# Patient Record
Sex: FEMALE | Race: WHITE | NOT HISPANIC OR LATINO | Employment: OTHER | URBAN - METROPOLITAN AREA
[De-identification: names, ages, dates, MRNs, and addresses within clinical notes are randomized per-mention and may not be internally consistent; named-entity substitution may affect disease eponyms.]

---

## 2020-03-06 ENCOUNTER — HOSPITAL ENCOUNTER (EMERGENCY)
Facility: HOSPITAL | Age: 55
Discharge: HOME | End: 2020-03-06
Attending: EMERGENCY MEDICINE | Admitting: EMERGENCY MEDICINE
Payer: COMMERCIAL

## 2020-03-06 VITALS
BODY MASS INDEX: 19.47 KG/M2 | TEMPERATURE: 98.8 F | SYSTOLIC BLOOD PRESSURE: 91 MMHG | OXYGEN SATURATION: 100 % | WEIGHT: 131.84 LBS | DIASTOLIC BLOOD PRESSURE: 52 MMHG | RESPIRATION RATE: 16 BRPM | HEART RATE: 78 BPM

## 2020-03-06 DIAGNOSIS — B34.9 VIRAL SYNDROME: Primary | ICD-10-CM

## 2020-03-06 DIAGNOSIS — R51.9 HEADACHE: ICD-10-CM

## 2020-03-06 LAB
ALBUMIN SERPL BCP-MCNC: 3.7 G/DL (ref 3.5–5)
ALP SERPL-CCNC: 38 U/L (ref 46–116)
ALT SERPL W P-5'-P-CCNC: 25 U/L (ref 12–78)
ANION GAP SERPL CALCULATED.3IONS-SCNC: 6 MMOL/L (ref 4–13)
AST SERPL W P-5'-P-CCNC: 19 U/L (ref 5–45)
BASOPHILS # BLD AUTO: 0 THOUSANDS/ΜL (ref 0–0.1)
BASOPHILS NFR BLD AUTO: 0 % (ref 0–1)
BILIRUB SERPL-MCNC: 1.59 MG/DL (ref 0.2–1)
BUN SERPL-MCNC: 19 MG/DL (ref 5–25)
CALCIUM SERPL-MCNC: 8.4 MG/DL (ref 8.3–10.1)
CHLORIDE SERPL-SCNC: 106 MMOL/L (ref 100–108)
CK SERPL-CCNC: 86 U/L (ref 26–192)
CO2 SERPL-SCNC: 30 MMOL/L (ref 21–32)
CREAT SERPL-MCNC: 0.69 MG/DL (ref 0.6–1.3)
CRP SERPL QL: 5.8 MG/L
EOSINOPHIL # BLD AUTO: 0.14 THOUSAND/ΜL (ref 0–0.61)
EOSINOPHIL NFR BLD AUTO: 2 % (ref 0–6)
ERYTHROCYTE [DISTWIDTH] IN BLOOD BY AUTOMATED COUNT: 12.3 % (ref 11.6–15.1)
FLUAV RNA NPH QL NAA+PROBE: NORMAL
FLUBV RNA NPH QL NAA+PROBE: NORMAL
GFR SERPL CREATININE-BSD FRML MDRD: 99 ML/MIN/1.73SQ M
GLUCOSE SERPL-MCNC: 95 MG/DL (ref 65–140)
HCT VFR BLD AUTO: 37 % (ref 34.8–46.1)
HGB BLD-MCNC: 12.4 G/DL (ref 11.5–15.4)
IMM GRANULOCYTES # BLD AUTO: 0.01 THOUSAND/UL (ref 0–0.2)
IMM GRANULOCYTES NFR BLD AUTO: 0 % (ref 0–2)
LYMPHOCYTES # BLD AUTO: 0.21 THOUSANDS/ΜL (ref 0.6–4.47)
LYMPHOCYTES NFR BLD AUTO: 4 % (ref 14–44)
MCH RBC QN AUTO: 31.2 PG (ref 26.8–34.3)
MCHC RBC AUTO-ENTMCNC: 33.5 G/DL (ref 31.4–37.4)
MCV RBC AUTO: 93 FL (ref 82–98)
MONOCYTES # BLD AUTO: 0.31 THOUSAND/ΜL (ref 0.17–1.22)
MONOCYTES NFR BLD AUTO: 5 % (ref 4–12)
NEUTROPHILS # BLD AUTO: 5.16 THOUSANDS/ΜL (ref 1.85–7.62)
NEUTS SEG NFR BLD AUTO: 89 % (ref 43–75)
NRBC BLD AUTO-RTO: 0 /100 WBCS
PLATELET # BLD AUTO: 158 THOUSANDS/UL (ref 149–390)
PMV BLD AUTO: 9.2 FL (ref 8.9–12.7)
POTASSIUM SERPL-SCNC: 4.3 MMOL/L (ref 3.5–5.3)
PROT SERPL-MCNC: 6.8 G/DL (ref 6.4–8.2)
RBC # BLD AUTO: 3.98 MILLION/UL (ref 3.81–5.12)
RSV RNA NPH QL NAA+PROBE: NORMAL
SODIUM SERPL-SCNC: 142 MMOL/L (ref 136–145)
WBC # BLD AUTO: 5.83 THOUSAND/UL (ref 4.31–10.16)

## 2020-03-06 PROCEDURE — 96375 TX/PRO/DX INJ NEW DRUG ADDON: CPT

## 2020-03-06 PROCEDURE — 96361 HYDRATE IV INFUSION ADD-ON: CPT

## 2020-03-06 PROCEDURE — 85025 COMPLETE CBC W/AUTO DIFF WBC: CPT | Performed by: PHYSICIAN ASSISTANT

## 2020-03-06 PROCEDURE — 80053 COMPREHEN METABOLIC PANEL: CPT | Performed by: PHYSICIAN ASSISTANT

## 2020-03-06 PROCEDURE — 86140 C-REACTIVE PROTEIN: CPT | Performed by: PHYSICIAN ASSISTANT

## 2020-03-06 PROCEDURE — 82550 ASSAY OF CK (CPK): CPT | Performed by: PHYSICIAN ASSISTANT

## 2020-03-06 PROCEDURE — 99283 EMERGENCY DEPT VISIT LOW MDM: CPT

## 2020-03-06 PROCEDURE — 36415 COLL VENOUS BLD VENIPUNCTURE: CPT | Performed by: PHYSICIAN ASSISTANT

## 2020-03-06 PROCEDURE — 96374 THER/PROPH/DIAG INJ IV PUSH: CPT

## 2020-03-06 PROCEDURE — 99283 EMERGENCY DEPT VISIT LOW MDM: CPT | Performed by: PHYSICIAN ASSISTANT

## 2020-03-06 PROCEDURE — 87631 RESP VIRUS 3-5 TARGETS: CPT | Performed by: EMERGENCY MEDICINE

## 2020-03-06 RX ORDER — MAGNESIUM HYDROXIDE/ALUMINUM HYDROXICE/SIMETHICONE 120; 1200; 1200 MG/30ML; MG/30ML; MG/30ML
30 SUSPENSION ORAL ONCE
Status: COMPLETED | OUTPATIENT
Start: 2020-03-06 | End: 2020-03-06

## 2020-03-06 RX ORDER — PAROXETINE 30 MG/1
30 TABLET, FILM COATED ORAL EVERY MORNING
COMMUNITY

## 2020-03-06 RX ORDER — ACETAMINOPHEN 325 MG/1
975 TABLET ORAL ONCE
Status: COMPLETED | OUTPATIENT
Start: 2020-03-06 | End: 2020-03-06

## 2020-03-06 RX ORDER — DIPHENHYDRAMINE HYDROCHLORIDE 50 MG/ML
25 INJECTION INTRAMUSCULAR; INTRAVENOUS ONCE
Status: COMPLETED | OUTPATIENT
Start: 2020-03-06 | End: 2020-03-06

## 2020-03-06 RX ORDER — METOCLOPRAMIDE HYDROCHLORIDE 5 MG/ML
10 INJECTION INTRAMUSCULAR; INTRAVENOUS ONCE
Status: COMPLETED | OUTPATIENT
Start: 2020-03-06 | End: 2020-03-06

## 2020-03-06 RX ADMIN — DEXAMETHASONE SODIUM PHOSPHATE 10 MG: 10 INJECTION, SOLUTION INTRAMUSCULAR; INTRAVENOUS at 13:34

## 2020-03-06 RX ADMIN — SODIUM CHLORIDE 1000 ML: 0.9 INJECTION, SOLUTION INTRAVENOUS at 13:37

## 2020-03-06 RX ADMIN — DIPHENHYDRAMINE HYDROCHLORIDE 25 MG: 50 INJECTION, SOLUTION INTRAMUSCULAR; INTRAVENOUS at 13:38

## 2020-03-06 RX ADMIN — ACETAMINOPHEN 975 MG: 325 TABLET, FILM COATED ORAL at 13:34

## 2020-03-06 RX ADMIN — ALUMINUM HYDROXIDE, MAGNESIUM HYDROXIDE, AND SIMETHICONE 30 ML: 200; 200; 20 SUSPENSION ORAL at 13:34

## 2020-03-06 RX ADMIN — METOCLOPRAMIDE HYDROCHLORIDE 10 MG: 5 INJECTION INTRAMUSCULAR; INTRAVENOUS at 13:41

## 2020-03-06 NOTE — ED PROVIDER NOTES
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 " Denies fevers  32-year-old past medical history depression, kidney stones presents to the ER today for generalized body aches and headache x1 day  Reports joint stiffness, achiness, and pain in all joints as well as a frontal headache that began yesterday  Reports nausea and 1 episode of diarrhea this morning  Denies fever, upper respiratory congestion or symptoms, vomiting, abdominal pain  Took 2 Aleve this morning without relief  She is most concerned about the severity of her headache and that she may have the flu 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 Taking? PARoxetine (PAXIL) 30 mg tablet   Yes Yes   Sig: Take 30 mg by mouth every morning      Facility-Administered Medications: None       History reviewed  No pertinent past medical history  Past Surgical History:   Procedure Laterality Date    AUGMENTATION BREAST      LAPAROTOMY         History reviewed  No pertinent family history  I have reviewed and agree with the history as documented  E-Cigarette/Vaping     E-Cigarette/Vaping Substances     Social History     Tobacco Use    Smoking status: Not on file   Substance Use Topics    Alcohol use: Not on file    Drug use: Not on file       Review of Systems   Constitutional: Positive for fatigue  Negative for chills, diaphoresis and fever  HENT: Negative for congestion, sneezing and sore throat  Eyes: Negative  Respiratory: Negative  Negative for cough, chest tightness, shortness of breath and wheezing  Cardiovascular: Negative  Negative for chest pain, palpitations and leg swelling  Gastrointestinal: Positive for diarrhea and nausea   Negative for abdominal pain, blood in stool, constipation and vomiting  Endocrine: Negative  Genitourinary: Negative  Negative for difficulty urinating, dysuria, frequency and menstrual problem  Musculoskeletal: Positive for arthralgias and myalgias  Negative for gait problem and joint swelling  Neurological: Negative for dizziness, tremors, seizures, syncope, weakness, light-headedness, numbness and headaches  Psychiatric/Behavioral: Negative  Physical Exam  Physical Exam   Constitutional: She is oriented to person, place, and time  She appears well-developed and well-nourished  She does not have a sickly appearance  She does not appear ill  No distress  HENT:   Head: Normocephalic and atraumatic  Right Ear: No tenderness  Tympanic membrane is not erythematous, not retracted and not bulging  Left Ear: No tenderness  Tympanic membrane is not erythematous, not retracted and not bulging  Nose: No rhinorrhea  Right sinus exhibits maxillary sinus tenderness  Right sinus exhibits no frontal sinus tenderness  Left sinus exhibits maxillary sinus tenderness  Left sinus exhibits no frontal sinus tenderness  Mouth/Throat: Oropharynx is clear and moist and mucous membranes are normal    Scalp non tender to palpation   Eyes: Pupils are equal, round, and reactive to light  Conjunctivae and EOM are normal    Neck: Normal range of motion  Neck supple  Paraspinal muscular tenderness to palpation   Cardiovascular: Normal rate, regular rhythm, S1 normal, S2 normal and intact distal pulses  Exam reveals no gallop and no friction rub  No murmur heard  Pulmonary/Chest: Effort normal and breath sounds normal  No respiratory distress  She has no decreased breath sounds  She has no wheezes  She has no rhonchi  She has no rales  She exhibits no tenderness  Abdominal: Soft  Bowel sounds are normal  She exhibits no distension  There is no tenderness  There is no rigidity and no guarding     Musculoskeletal:   All joints tender to palpation, full passive and active ROM in all joints without increasing pain  Neurological: She is alert and oriented to person, place, and time  No cranial nerve deficit or sensory deficit  GCS eye subscore is 4  GCS verbal subscore is 5  GCS motor subscore is 6  Strength 4/5 in upper and lower extremities  Tone normal   Skin: Skin is warm and dry  Capillary refill takes less than 2 seconds  No rash noted  She is not diaphoretic  No erythema  Vitals reviewed        Vital Signs  ED Triage Vitals   Temperature Pulse Respirations Blood Pressure SpO2   03/06/20 1158 03/06/20 1158 03/06/20 1158 03/06/20 1158 03/06/20 1158   98 8 °F (37 1 °C) 86 18 99/54 97 %      Temp Source Heart Rate Source Patient Position - Orthostatic VS BP Location FiO2 (%)   03/06/20 1158 03/06/20 1158 03/06/20 1158 03/06/20 1158 --   Oral Monitor Sitting Right arm       Pain Score       03/06/20 1400       No Pain           Vitals:    03/06/20 1158 03/06/20 1425   BP: 99/54 91/52   Pulse: 86 78   Patient Position - Orthostatic VS: Sitting Lying         Visual Acuity      ED Medications  Medications   acetaminophen (TYLENOL) tablet 975 mg (975 mg Oral Given 3/6/20 1334)   metoclopramide (REGLAN) injection 10 mg (10 mg Intravenous Given 3/6/20 1341)   diphenhydrAMINE (BENADRYL) injection 25 mg (25 mg Intravenous Given 3/6/20 1338)   sodium chloride 0 9 % bolus 1,000 mL (0 mL Intravenous Stopped 3/6/20 1513)   aluminum-magnesium hydroxide-simethicone (MYLANTA) 200-200-20 mg/5 mL oral suspension 30 mL (30 mL Oral Given 3/6/20 1334)   dexamethasone 10 mg/mL oral liquid 10 mg 1 mL (10 mg Oral Given 3/6/20 1334)       Diagnostic Studies  Results Reviewed     Procedure Component Value Units Date/Time    Influenza A/B and RSV PCR [[id number]]  (Normal) Collected:  03/06/20 1344    Lab Status:  Final result Specimen:  Nasopharyngeal Swab Updated:  03/06/20 1425     INFLUENZA A PCR None Detected     INFLUENZA B PCR None Detected     RSV PCR None Detected    CK (with reflex to MB) [[id number]]  (Normal) Collected:  03/06/20 1313    Lab Status:  Final result Specimen:  Blood from Arm, Right Updated:  03/06/20 1342     Total CK 86 U/L     C-reactive protein [[id number]]  (Abnormal) Collected:  03/06/20 1313    Lab Status:  Final result Specimen:  Blood from Arm, Right Updated:  03/06/20 1342     CRP 5 8 mg/L     Comprehensive metabolic panel [[id number]]  (Abnormal) Collected:  03/06/20 1313    Lab Status:  Final result Specimen:  Blood from Arm, Right Updated:  03/06/20 1332     Sodium 142 mmol/L      Potassium 4 3 mmol/L      Chloride 106 mmol/L      CO2 30 mmol/L      ANION GAP 6 mmol/L      BUN 19 mg/dL      Creatinine 0 69 mg/dL      Glucose 95 mg/dL      Calcium 8 4 mg/dL      AST 19 U/L      ALT 25 U/L      Alkaline Phosphatase 38 U/L      Total Protein 6 8 g/dL      Albumin 3 7 g/dL      Total Bilirubin 1 59 mg/dL      eGFR 99 ml/min/1 73sq m     Narrative:       [place] guidelines for Chronic Kidney Disease (CKD):     Stage 1 with normal or high GFR (GFR > 90 mL/min/1 73 square meters)    Stage 2 Mild CKD (GFR = 60-89 mL/min/1 73 square meters)    Stage 3A Moderate CKD (GFR = 45-59 mL/min/1 73 square meters)    Stage 3B Moderate CKD (GFR = 30-44 mL/min/1 73 square meters)    Stage 4 Severe CKD (GFR = 15-29 mL/min/1 73 square meters)    Stage 5 End Stage CKD (GFR <15 mL/min/1 73 square meters)  Note: GFR calculation is accurate only with a steady state creatinine    CBC and differential [[id number]]  (Abnormal) Collected:  03/06/20 1313    Lab Status:  Final result Specimen:  Blood from Arm, Right Updated:  03/06/20 1318     WBC 5 83 Thousand/uL      RBC 3 98 Million/uL      Hemoglobin 12 4 g/dL      Hematocrit 37 0 %      MCV 93 fL      MCH 31 2 pg      MCHC 33 5 g/dL      RDW 12 3 %      MPV 9 2 fL      Platelets 487 Thousands/uL      nRBC 0 /100 WBCs      Neutrophils Relative 89 %      Immat GRANS % 0 %      Lymphocytes Relative 4 % Monocytes Relative 5 %      Eosinophils Relative 2 %      Basophils Relative 0 %      Neutrophils Absolute 5 16 Thousands/µL      Immature Grans Absolute 0 01 Thousand/uL      Lymphocytes Absolute 0 21 Thousands/µL      Monocytes Absolute 0 31 Thousand/µL      Eosinophils Absolute 0 14 Thousand/µL      Basophils Absolute 0 00 Thousands/µL                No orders to display              Procedures  Procedures         ED Course           MDM  Number of Diagnoses or Management Options  Headache:   Viral syndrome:   Diagnosis management comments: History and symptoms appear to be viral in nature, flu/RSV swab, CBC, CMP, CK, CRP ordered  Lab results within normal limits - WBC 5 3, CK 86, CRP 5 8,  flu/RSV swab negative  Given migraine cocktail for HA, which improved symptoms significantly  Patient's symptoms likely viral in nature  She was stable for discharge to home  She was advised to take Motrin, Tylenol, or over-the-counter cold medications for symptoms and follow-up with her PCP in 1 week, or sooner if symptoms worsen  She was given strict ER return precautions for worsening or persistent headache, worsening weakness, vision changes, dizziness, numbness or weakness in extremities, gait abnormalities  Disposition  Final diagnoses:   Viral syndrome   Headache     Time reflects when diagnosis was documented in both MDM as applicable and the Disposition within this note     Time User Action Codes Description Comment    3/6/2020  3:13 PM Vidal Lew Add [B34 9] Viral syndrome     3/6/2020  3:15 PM Daniele Harrison [facility] Headache       ED Disposition     ED Disposition Condition Date/Time Comment    Discharge Stable Fri Mar 6, 2020  3:13 PM Doc Venegas discharge to home/self care              Follow-up Information     Follow up With Specialties Details Why Contact Dwight Briscoe, DO Family Medicine In 1 week Schedule an ER follow up appointment with your PCP within one week or sooner as needed or if symptoms worsen [address]  [phone]            Discharge Medication List as of 3/6/2020  3:24 PM      CONTINUE these medications which have NOT CHANGED    Details   PARoxetine (PAXIL) 30 mg tablet Take 30 mg by mouth every morning, Historical Med           No discharge procedures on file      PDMP Review     None          ED Provider  Electronically Signed by           Kirill Dozier PA-C  03/06/20 0208

## 2020-05-14 NOTE — ED ATTENDING ATTESTATION
3/6/2020  IAwais MD, saw and evaluated the patient  I have discussed the patient with the resident/non-physician practitioner and agree with the resident's/non-physician practitioner's findings, Plan of Care, and MDM as documented in the resident's/non-physician practitioner's note, except where noted  All available labs and Radiology studies were reviewed  I was present for key portions of any procedure(s) performed by the resident/non-physician practitioner and I was immediately available to provide assistance  At this point I agree with the current assessment done in the Emergency Department  I have conducted an independent evaluation of this patient a history and physical is as follows: This is a 48 y o  old female who presents to the ED for evaluation of diffuse arthralgias, ha  Since yesterday, 2 aleve, no relief, some nausea and diarrhea  No fevers, but chills  No cough or congestion, sore throat  VS and nursing notes reviewed  General: Appears in NAD, awake, alert, speaking normally in full sentences  Well-nourished, well-developed  Appears stated age  Head: Normocephalic, atraumatic  Eyes: EOMI  Vision grossly normal  No subconjunctival hemorrhages or occular discharge noted  Symmetrical lids  ENT: Atraumatic external nose and ears  No stridor  Normal phonation  No drooling  Normal swallowing  Neck: No JVD  FROM  No goiter  CV: No pallor  Normal rate  Lungs: No tachypnea  No respiratory distress  MSK: Moving all extremities equally, no peripheral edema  Skin: Dry, intact  No cyanosis  Neuro: Awake, alert, GCS15  CN II-XII grossly intact  Grossly normal gait  Psychiatric/Behavioral: Appropriate mood and affect  A/P: This is a 48 y o  female who presents to the ED for evaluation of malaise  Suspect viral syndrome  Labs, IVF, Migraine cocktail, flu swab at patient request  Reevaluate and dispo accordingly      ED Course       Critical Care Time  Procedures
PAST MEDICAL HISTORY:  Anxiety

## 2021-12-24 ENCOUNTER — APPOINTMENT (EMERGENCY)
Dept: CT IMAGING | Facility: HOSPITAL | Age: 56
End: 2021-12-24
Payer: COMMERCIAL

## 2021-12-24 ENCOUNTER — HOSPITAL ENCOUNTER (OUTPATIENT)
Facility: HOSPITAL | Age: 56
Setting detail: OBSERVATION
Discharge: HOME | End: 2021-12-25
Attending: EMERGENCY MEDICINE | Admitting: INTERNAL MEDICINE
Payer: COMMERCIAL

## 2021-12-24 ENCOUNTER — APPOINTMENT (EMERGENCY)
Dept: RADIOLOGY | Facility: HOSPITAL | Age: 56
End: 2021-12-24
Payer: COMMERCIAL

## 2021-12-24 DIAGNOSIS — S09.90XA CLOSED HEAD INJURY, INITIAL ENCOUNTER: ICD-10-CM

## 2021-12-24 DIAGNOSIS — S01.81XA FACIAL LACERATION, INITIAL ENCOUNTER: ICD-10-CM

## 2021-12-24 DIAGNOSIS — R55 SYNCOPE: Primary | ICD-10-CM

## 2021-12-24 LAB
2HR DELTA HS TROPONIN: 7 NG/L
4HR DELTA HS TROPONIN: 13 NG/L
ALBUMIN SERPL BCP-MCNC: 3.9 G/DL (ref 3.5–5)
ALP SERPL-CCNC: 50 U/L (ref 46–116)
ALT SERPL W P-5'-P-CCNC: 25 U/L (ref 12–78)
ANION GAP SERPL CALCULATED.3IONS-SCNC: 7 MMOL/L (ref 4–13)
AST SERPL W P-5'-P-CCNC: 20 U/L (ref 5–45)
BASOPHILS # BLD AUTO: 0.04 THOUSANDS/ΜL (ref 0–0.1)
BASOPHILS NFR BLD AUTO: 1 % (ref 0–1)
BILIRUB SERPL-MCNC: 1.4 MG/DL (ref 0.2–1)
BUN SERPL-MCNC: 20 MG/DL (ref 5–25)
CALCIUM SERPL-MCNC: 9.3 MG/DL (ref 8.3–10.1)
CARDIAC TROPONIN I PNL SERPL HS: 15 NG/L
CARDIAC TROPONIN I PNL SERPL HS: 19 NG/L
CARDIAC TROPONIN I PNL SERPL HS: 21 NG/L
CARDIAC TROPONIN I PNL SERPL HS: 8 NG/L
CHLORIDE SERPL-SCNC: 104 MMOL/L (ref 100–108)
CO2 SERPL-SCNC: 28 MMOL/L (ref 21–32)
CREAT SERPL-MCNC: 0.94 MG/DL (ref 0.6–1.3)
D DIMER PPP FEU-MCNC: 0.73 UG/ML FEU
EOSINOPHIL # BLD AUTO: 0.18 THOUSAND/ΜL (ref 0–0.61)
EOSINOPHIL NFR BLD AUTO: 4 % (ref 0–6)
ERYTHROCYTE [DISTWIDTH] IN BLOOD BY AUTOMATED COUNT: 12.3 % (ref 11.6–15.1)
FLUAV RNA RESP QL NAA+PROBE: NEGATIVE
FLUBV RNA RESP QL NAA+PROBE: NEGATIVE
GFR SERPL CREATININE-BSD FRML MDRD: 68 ML/MIN/1.73SQ M
GLUCOSE SERPL-MCNC: 108 MG/DL (ref 65–140)
HCT VFR BLD AUTO: 38.1 % (ref 34.8–46.1)
HGB BLD-MCNC: 13.3 G/DL (ref 11.5–15.4)
IMM GRANULOCYTES # BLD AUTO: 0.02 THOUSAND/UL (ref 0–0.2)
IMM GRANULOCYTES NFR BLD AUTO: 0 % (ref 0–2)
LYMPHOCYTES # BLD AUTO: 1.67 THOUSANDS/ΜL (ref 0.6–4.47)
LYMPHOCYTES NFR BLD AUTO: 33 % (ref 14–44)
MAGNESIUM SERPL-MCNC: 2 MG/DL (ref 1.6–2.6)
MCH RBC QN AUTO: 31 PG (ref 26.8–34.3)
MCHC RBC AUTO-ENTMCNC: 34.9 G/DL (ref 31.4–37.4)
MCV RBC AUTO: 89 FL (ref 82–98)
MONOCYTES # BLD AUTO: 0.46 THOUSAND/ΜL (ref 0.17–1.22)
MONOCYTES NFR BLD AUTO: 9 % (ref 4–12)
NEUTROPHILS # BLD AUTO: 2.72 THOUSANDS/ΜL (ref 1.85–7.62)
NEUTS SEG NFR BLD AUTO: 53 % (ref 43–75)
NRBC BLD AUTO-RTO: 0 /100 WBCS
NT-PROBNP SERPL-MCNC: 98 PG/ML
PLATELET # BLD AUTO: 184 THOUSANDS/UL (ref 149–390)
PMV BLD AUTO: 9.1 FL (ref 8.9–12.7)
POTASSIUM SERPL-SCNC: 3.8 MMOL/L (ref 3.5–5.3)
PROT SERPL-MCNC: 7.4 G/DL (ref 6.4–8.2)
RBC # BLD AUTO: 4.29 MILLION/UL (ref 3.81–5.12)
RSV RNA RESP QL NAA+PROBE: NEGATIVE
SARS-COV-2 RNA RESP QL NAA+PROBE: NEGATIVE
SODIUM SERPL-SCNC: 139 MMOL/L (ref 136–145)
TSH SERPL DL<=0.05 MIU/L-ACNC: 1.26 UIU/ML (ref 0.36–3.74)
WBC # BLD AUTO: 5.09 THOUSAND/UL (ref 4.31–10.16)

## 2021-12-24 PROCEDURE — 83880 ASSAY OF NATRIURETIC PEPTIDE: CPT | Performed by: EMERGENCY MEDICINE

## 2021-12-24 PROCEDURE — 71045 X-RAY EXAM CHEST 1 VIEW: CPT

## 2021-12-24 PROCEDURE — 70486 CT MAXILLOFACIAL W/O DYE: CPT

## 2021-12-24 PROCEDURE — 80053 COMPREHEN METABOLIC PANEL: CPT | Performed by: EMERGENCY MEDICINE

## 2021-12-24 PROCEDURE — 99220 PR INITIAL OBSERVATION CARE/DAY 70 MINUTES: CPT | Performed by: INTERNAL MEDICINE

## 2021-12-24 PROCEDURE — 96360 HYDRATION IV INFUSION INIT: CPT

## 2021-12-24 PROCEDURE — 85025 COMPLETE CBC W/AUTO DIFF WBC: CPT | Performed by: EMERGENCY MEDICINE

## 2021-12-24 PROCEDURE — 83735 ASSAY OF MAGNESIUM: CPT | Performed by: EMERGENCY MEDICINE

## 2021-12-24 PROCEDURE — 70450 CT HEAD/BRAIN W/O DYE: CPT

## 2021-12-24 PROCEDURE — 0241U HB NFCT DS VIR RESP RNA 4 TRGT: CPT | Performed by: EMERGENCY MEDICINE

## 2021-12-24 PROCEDURE — 84443 ASSAY THYROID STIM HORMONE: CPT | Performed by: EMERGENCY MEDICINE

## 2021-12-24 PROCEDURE — 36415 COLL VENOUS BLD VENIPUNCTURE: CPT | Performed by: EMERGENCY MEDICINE

## 2021-12-24 PROCEDURE — 71275 CT ANGIOGRAPHY CHEST: CPT

## 2021-12-24 PROCEDURE — 99285 EMERGENCY DEPT VISIT HI MDM: CPT

## 2021-12-24 PROCEDURE — 12013 RPR F/E/E/N/L/M 2.6-5.0 CM: CPT | Performed by: EMERGENCY MEDICINE

## 2021-12-24 PROCEDURE — 84484 ASSAY OF TROPONIN QUANT: CPT | Performed by: EMERGENCY MEDICINE

## 2021-12-24 PROCEDURE — 99285 EMERGENCY DEPT VISIT HI MDM: CPT | Performed by: EMERGENCY MEDICINE

## 2021-12-24 PROCEDURE — 93005 ELECTROCARDIOGRAM TRACING: CPT

## 2021-12-24 PROCEDURE — 84484 ASSAY OF TROPONIN QUANT: CPT | Performed by: INTERNAL MEDICINE

## 2021-12-24 PROCEDURE — 72125 CT NECK SPINE W/O DYE: CPT

## 2021-12-24 PROCEDURE — 85379 FIBRIN DEGRADATION QUANT: CPT | Performed by: EMERGENCY MEDICINE

## 2021-12-24 RX ORDER — PAROXETINE HYDROCHLORIDE 20 MG/1
30 TABLET, FILM COATED ORAL EVERY MORNING
Status: DISCONTINUED | OUTPATIENT
Start: 2021-12-25 | End: 2021-12-25 | Stop reason: HOSPADM

## 2021-12-24 RX ORDER — GINSENG 100 MG
1 CAPSULE ORAL ONCE
Status: COMPLETED | OUTPATIENT
Start: 2021-12-24 | End: 2021-12-24

## 2021-12-24 RX ORDER — ACETAMINOPHEN 325 MG/1
650 TABLET ORAL ONCE
Status: COMPLETED | OUTPATIENT
Start: 2021-12-24 | End: 2021-12-24

## 2021-12-24 RX ORDER — ACETAMINOPHEN 325 MG/1
650 TABLET ORAL EVERY 6 HOURS PRN
Status: DISCONTINUED | OUTPATIENT
Start: 2021-12-24 | End: 2021-12-25 | Stop reason: HOSPADM

## 2021-12-24 RX ORDER — LIDOCAINE HYDROCHLORIDE AND EPINEPHRINE 10; 10 MG/ML; UG/ML
5 INJECTION, SOLUTION INFILTRATION; PERINEURAL ONCE
Status: COMPLETED | OUTPATIENT
Start: 2021-12-24 | End: 2021-12-24

## 2021-12-24 RX ORDER — KETOROLAC TROMETHAMINE 30 MG/ML
15 INJECTION, SOLUTION INTRAMUSCULAR; INTRAVENOUS ONCE
Status: COMPLETED | OUTPATIENT
Start: 2021-12-24 | End: 2021-12-24

## 2021-12-24 RX ADMIN — IOHEXOL 85 ML: 350 INJECTION, SOLUTION INTRAVENOUS at 17:25

## 2021-12-24 RX ADMIN — BACITRACIN ZINC 1 SMALL APPLICATION: 500 OINTMENT TOPICAL at 16:22

## 2021-12-24 RX ADMIN — LIDOCAINE HYDROCHLORIDE,EPINEPHRINE BITARTRATE 5 ML: 10; .01 INJECTION, SOLUTION INFILTRATION; PERINEURAL at 16:22

## 2021-12-24 RX ADMIN — SODIUM CHLORIDE 1000 ML: 0.9 INJECTION, SOLUTION INTRAVENOUS at 16:21

## 2021-12-24 RX ADMIN — KETOROLAC TROMETHAMINE 15 MG: 30 INJECTION, SOLUTION INTRAMUSCULAR at 19:17

## 2021-12-24 RX ADMIN — ACETAMINOPHEN 650 MG: 325 TABLET, FILM COATED ORAL at 19:17

## 2021-12-25 VITALS
OXYGEN SATURATION: 95 % | BODY MASS INDEX: 19.99 KG/M2 | DIASTOLIC BLOOD PRESSURE: 53 MMHG | RESPIRATION RATE: 20 BRPM | SYSTOLIC BLOOD PRESSURE: 98 MMHG | HEART RATE: 60 BPM | WEIGHT: 135 LBS | TEMPERATURE: 98.1 F | HEIGHT: 69 IN

## 2021-12-25 LAB
2HR DELTA HS TROPONIN: 1 NG/L
4HR DELTA HS TROPONIN: -1 NG/L
ANION GAP SERPL CALCULATED.3IONS-SCNC: 4 MMOL/L (ref 4–13)
ATRIAL RATE: 77 BPM
BACTERIA UR QL AUTO: ABNORMAL /HPF
BILIRUB UR QL STRIP: NEGATIVE
BUN SERPL-MCNC: 20 MG/DL (ref 5–25)
CALCIUM SERPL-MCNC: 9.1 MG/DL (ref 8.3–10.1)
CARDIAC TROPONIN I PNL SERPL HS: 18 NG/L
CARDIAC TROPONIN I PNL SERPL HS: 20 NG/L
CHLORIDE SERPL-SCNC: 107 MMOL/L (ref 100–108)
CHOLEST SERPL-MCNC: 164 MG/DL
CLARITY UR: CLEAR
CO2 SERPL-SCNC: 29 MMOL/L (ref 21–32)
COLOR UR: ABNORMAL
CREAT SERPL-MCNC: 0.86 MG/DL (ref 0.6–1.3)
GFR SERPL CREATININE-BSD FRML MDRD: 76 ML/MIN/1.73SQ M
GLUCOSE P FAST SERPL-MCNC: 93 MG/DL (ref 65–99)
GLUCOSE SERPL-MCNC: 93 MG/DL (ref 65–140)
GLUCOSE UR STRIP-MCNC: NEGATIVE MG/DL
HDLC SERPL-MCNC: 74 MG/DL
HGB UR QL STRIP.AUTO: ABNORMAL
KETONES UR STRIP-MCNC: NEGATIVE MG/DL
LDLC SERPL CALC-MCNC: 82 MG/DL (ref 0–100)
LEUKOCYTE ESTERASE UR QL STRIP: ABNORMAL
MAGNESIUM SERPL-MCNC: 2 MG/DL (ref 1.6–2.6)
MUCOUS THREADS UR QL AUTO: ABNORMAL
NITRITE UR QL STRIP: NEGATIVE
NON-SQ EPI CELLS URNS QL MICRO: ABNORMAL /HPF
P AXIS: 23 DEGREES
PH UR STRIP.AUTO: 6 [PH]
POTASSIUM SERPL-SCNC: 4 MMOL/L (ref 3.5–5.3)
PR INTERVAL: 158 MS
PROT UR STRIP-MCNC: NEGATIVE MG/DL
QRS AXIS: 44 DEGREES
QRSD INTERVAL: 74 MS
QT INTERVAL: 370 MS
QTC INTERVAL: 411 MS
RBC #/AREA URNS AUTO: ABNORMAL /HPF
SODIUM SERPL-SCNC: 140 MMOL/L (ref 136–145)
SP GR UR STRIP.AUTO: 1.01 (ref 1–1.03)
T WAVE AXIS: 51 DEGREES
TRIGL SERPL-MCNC: 42 MG/DL
UROBILINOGEN UR QL STRIP.AUTO: 0.2 E.U./DL
VENTRICULAR RATE: 74 BPM
WBC #/AREA URNS AUTO: ABNORMAL /HPF

## 2021-12-25 PROCEDURE — 99217 PR OBSERVATION CARE DISCHARGE MANAGEMENT: CPT | Performed by: INTERNAL MEDICINE

## 2021-12-25 PROCEDURE — 93010 ELECTROCARDIOGRAM REPORT: CPT | Performed by: INTERNAL MEDICINE

## 2021-12-25 PROCEDURE — 81001 URINALYSIS AUTO W/SCOPE: CPT | Performed by: INTERNAL MEDICINE

## 2021-12-25 PROCEDURE — 80048 BASIC METABOLIC PNL TOTAL CA: CPT | Performed by: INTERNAL MEDICINE

## 2021-12-25 PROCEDURE — 83735 ASSAY OF MAGNESIUM: CPT | Performed by: INTERNAL MEDICINE

## 2021-12-25 PROCEDURE — 84484 ASSAY OF TROPONIN QUANT: CPT | Performed by: INTERNAL MEDICINE

## 2021-12-25 PROCEDURE — 80061 LIPID PANEL: CPT | Performed by: INTERNAL MEDICINE

## 2021-12-25 PROCEDURE — 99244 OFF/OP CNSLTJ NEW/EST MOD 40: CPT | Performed by: INTERNAL MEDICINE

## 2021-12-25 RX ADMIN — ACETAMINOPHEN 650 MG: 325 TABLET, FILM COATED ORAL at 09:08

## 2021-12-25 RX ADMIN — PAROXETINE 30 MG: 20 TABLET, FILM COATED ORAL at 09:08

## 2021-12-28 ENCOUNTER — TELEPHONE (OUTPATIENT)
Dept: CARDIOLOGY CLINIC | Facility: CLINIC | Age: 56
End: 2021-12-28

## 2021-12-28 NOTE — TELEPHONE ENCOUNTER
Diana Thomson,    This patient has an order for a 2 week Zio Patch monitor  Ordered by Cardiology  MONIQUE Hernandez, / Lucetta Bosworth MD     Please check on prior auth  Thanks!   Jackson Navarrete

## 2022-01-05 ENCOUNTER — OFFICE VISIT (OUTPATIENT)
Dept: CARDIOLOGY CLINIC | Facility: CLINIC | Age: 57
End: 2022-01-05
Payer: COMMERCIAL

## 2022-01-05 VITALS
BODY MASS INDEX: 19.48 KG/M2 | RESPIRATION RATE: 18 BRPM | HEIGHT: 69 IN | HEART RATE: 54 BPM | DIASTOLIC BLOOD PRESSURE: 70 MMHG | SYSTOLIC BLOOD PRESSURE: 98 MMHG | WEIGHT: 131.5 LBS | OXYGEN SATURATION: 95 %

## 2022-01-05 DIAGNOSIS — R55 SYNCOPE: ICD-10-CM

## 2022-01-05 PROCEDURE — 99214 OFFICE O/P EST MOD 30 MIN: CPT | Performed by: NURSE PRACTITIONER

## 2022-01-05 RX ORDER — SULFAMETHOXAZOLE AND TRIMETHOPRIM 800; 160 MG/1; MG/1
TABLET ORAL
COMMUNITY
Start: 2021-12-15 | End: 2022-01-05 | Stop reason: ALTCHOICE

## 2022-01-05 NOTE — LETTER
January 5, 2022     Wu Galloway DO  [address]    Patient: Chauncey Hillman   YOB: 1965   Date of Visit: 1/5/2022       Dear Dr Robyn Obrien: Thank you for referring David Valenzuela to me for evaluation  Below are my notes for this consultation  If you have questions, please do not hesitate to call me  I look forward to following your patient along with you  Sincerely,        FRANCISCO JAVIER Slauhgter        CC: MD Anne Nath [place]  1/5/2022  9:01 AM  Sign when [facility] Visit  Cardiology  [facility]   Office Visit Note  Chauncey Hillamn   55 y o    female   MRN: [id number]  [address]  [address]  [place] [address] [place] [address]  [phone]  [phone]    PCP: Wu Galloway DO  Cardiologist:  Will be Dr Kyler Fuller            Summary of recommendations  Heart healthy diet  Educational information provided  Hydrate, 2 L of fluid a day  Await the echocardiogram findings  Ambulatory extended Holter monitor: Pending  This has been mail to her house and is in transit  Recommend no driving until cleared in follow-up by Dr Kyler Fuller  Decrease caffeine  Drinking 3 10 oz cups a day currently  Follow up will be scheduled with Dr Kyler Fuller after testing is complete        Assessment/plan  Syncope, with laceration of forehead requiring suturing  Recent hospital admission 12/24-12/25/21  Unclear etiology    She had clear palpitations preceding the event  -Echocardiogram: Pending  -EKG showed normal sinus rhythm prominent P waves,   -CTA: No PE  -orthostatics were unremarkable  -TSH normal  -COVID/flu/RSV negative  Abnormal CTH: possible Chiari malformation:  Outpatient EEG/MRI recommended by Neurology  Lipids 12/25/21:  LDL 82, non HDL 90  BP 98/70  Depression on Paxil for a long time; this is not new  Cardiac testing   TTE  1/12/22: Pending            HPI  David Valenzuela is a 56 yo female with no prior cardiac history    Adm 12/24-12/25/21 Marcella Zamarripa  Chief complaint: syncope  Reported she started with palpitations and chest tightness all sitting on the couch  She woke up on the floor with amnesia  Found by her son face down, laceration of her forehead requiring repair  Followed by Cardiology and Neurology  EKG normal sinus rhythm 74 beats per minute  millisecond  No prior symptoms  Telemetry unrevealing  Electrolytes unremarkable/CBC within normal limits, TSH normal  Cardiac enzymes negative      1/5/22  Hospital follow-up, syncope  Since discharge, she denies recurrent symptoms  No chest pain no palpitations no lightheadedness  Occasionally in the morning she is dizzy  Blood pressure today 98/60  Historically she drinks about 3 cups of coffee a day  On that particular day she recall she did not have any extra water  She normally does hydrate, 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  Occasionally in the morning she is little dizzy  To review, she denies any use of illicit drugs, herbs, energy drinks  No tobacco   No regular alcohol  Family history: No family history sudden cardiac death  On exam she is euvolemic  She does have left-sided periorbital ecchymosis  Her echocardiogram is pending  The event monitor has been mailed to her house and is in transit  I recommended no driving until her evaluation is complete  She also needs to schedule follow-up with Neurology  This is in process  Follow-up with her cardiologist after testing is complete          I have spent 25 minutes with Patient  today in which greater than 50% of this time was spent in counseling/coordination of care regarding Intructions for management, Patient and family education, Importance of tx compliance and Risk factor reductions    Assessment  Diagnoses and all orders for this visit:    Syncope  -     Ambulatory referral to Cardiology    Other orders  -     Discontinue: sulfamethoxazole-trimethoprim (BACTRIM DS) 800-160 mg per tablet;  (Patient not taking: Reported on 1/5/2022 )  -     Discontinue: tamsulosin (FLOMAX) 0 4 mg; Take by mouth (Patient not taking: Reported on 1/5/2022 )          History reviewed  No pertinent past medical history  Review of Systems   Constitutional: Negative for chills  Cardiovascular: Negative for chest pain, claudication, cyanosis, dyspnea on exertion, irregular heartbeat, leg swelling, near-syncope, orthopnea, palpitations, paroxysmal nocturnal dyspnea and syncope  Respiratory: Negative for cough and shortness of breath  Gastrointestinal: Negative for heartburn and nausea  Neurological: Negative for dizziness, focal weakness, headaches, light-headedness and weakness  All other systems reviewed and are negative  Allergies   Allergen Reactions    Penicillins Rash     Pt gets yeast infections from antibiotic            Current Outpatient Medications:     PARoxetine (PAXIL) 30 mg tablet, Take 30 mg by mouth every morning, Disp: , Rfl:         Social History     Socioeconomic History    Marital status: /Civil Union     Spouse name: Not on file    Number of children: Not on file    Years of education: Not on file    Highest education level: Not on file   Occupational History    Not on file   Tobacco Use    Smoking status: Never Smoker    Smokeless tobacco: Never Used   Vaping Use    Vaping Use: Never used   Substance and Sexual Activity    Alcohol use: Not Currently    Drug use: Never    Sexual activity: Not on file   Other Topics Concern    Not on file   Social History Narrative    Not on file     Social Determinants of Health     Financial Resource Strain: Not on file   Food Insecurity: Not on file   Transportation Needs: Not on file   Physical Activity: Not on file   Stress: Not on file   Social Connections: Not on file   Intimate Partner Violence: Not on file   Housing Stability: Not on file       Family History Problem Relation Age of Onset    Colon cancer Mother     Prostate cancer Father     Aortic aneurysm Father     Diabetes Father     Transient ischemic attack Father     Stroke Paternal Grandfather        Physical Exam  Vitals and nursing note reviewed  Constitutional:       General: She is not in acute distress  Appearance: She is not diaphoretic  HENT:      Head: Normocephalic and atraumatic  Eyes:      Conjunctiva/sclera: Conjunctivae normal       Comments: Left-sided periorbital ecchymosis   Cardiovascular:      Rate and Rhythm: Normal rate and regular rhythm  Pulses: Intact distal pulses  Heart sounds: Normal heart sounds  Pulmonary:      Effort: Pulmonary effort is normal       Breath sounds: Normal breath sounds  Abdominal:      General: Bowel sounds are normal       Palpations: Abdomen is soft  Musculoskeletal:         General: Normal range of motion  Cervical back: Normal range of motion and neck supple  Skin:     General: Skin is warm and dry  Neurological:      Mental Status: She is alert and oriented to person, place, and time  Vitals: Blood pressure 98/70, pulse (!) 54, resp  rate 18, height 5' 9" (1 753 m), weight 59 6 kg (131 lb 8 oz), SpO2 95 %  Wt Readings from Last 3 Encounters:   01/05/22 59 6 kg (131 lb 8 oz)   12/24/21 61 2 kg (135 lb)   03/06/20 59 8 kg (131 lb 13 4 oz)         Labs & Results:  Lab Results   Component Value Date    WBC 5 09 12/24/2021    HGB 13 3 12/24/2021    HCT 38 1 12/24/2021    MCV 89 12/24/2021     12/24/2021     No results found for: BNP  No components found for: CHEM  Total CK   Date Value Ref Range Status   03/06/2020 86 26 - 192 U/L Final     No results found for this or any previous visit  No results found for this or any previous visit  This note was completed in part utilizing [id number] direct voice recognition software     Grammatical errors, random word insertion, spelling mistakes, and incomplete sentences may be an occasional consequence of the system secondary to software limitations, ambient noise and hardware issues  At the time of dictation, efforts were made to edit, clarify and /or correct errors  Please read the chart carefully and recognize, using context, where substitutions have occurred    If you have any questions or concerns about the context, text or information contained within the body of this dictation, please contact myself, the provider, for further clarification

## 2022-01-05 NOTE — PROGRESS NOTES
Cardiology  Hospital Follow Up   Office Visit Note  Fallon Mena   55 y o    female   MRN: [id number]  [address]  [address]  [place] [address] [facility] [address] [address]  [phone]    PCP: Josiane Aguirre DO  Cardiologist:  Will be Dr Rhea Contreras            Summary of recommendations  Heart healthy diet  Educational information provided  Hydrate, 2 L of fluid a day  Await the echocardiogram findings  Ambulatory extended Holter monitor: Pending  This has been mail to her house and is in transit  Recommend no driving until cleared in follow-up by Dr Rhea Contreras  Decrease caffeine  Drinking 3 10 oz cups a day currently  Follow up will be scheduled with Dr Rhea Contreras after testing is complete        Assessment/plan  Syncope, with laceration of forehead requiring suturing  Recent hospital admission 12/24-12/25/21  Unclear etiology  She had clear palpitations preceding the event  -Echocardiogram: Pending  -EKG showed normal sinus rhythm prominent P waves,   -CTA: No PE  -orthostatics were unremarkable  -TSH normal  -COVID/flu/RSV negative  Abnormal CTH: possible Chiari malformation:  Outpatient EEG/MRI recommended by Neurology  Lipids 12/25/21:  LDL 82, non HDL 90  BP 98/70  Depression on Paxil for a long time; this is not new  Cardiac testing   TTE  1/12/22: Pending            HPI  Liliane Cho is a 56 yo female with no prior cardiac history    Adm 12/24-12/25/21 [facility]  Chief complaint: syncope  Reported she started with palpitations and chest tightness all sitting on the couch    She woke up on the floor with amnesia  Found by her son face down, laceration of her forehead requiring repair  Followed by Cardiology and Neurology  EKG normal sinus rhythm 74 beats per minute  millisecond  No prior symptoms  Telemetry unrevealing  Electrolytes unremarkable/CBC within normal limits, TSH normal  Cardiac enzymes negative      1/5/22  Hospital follow-up, syncope  Since discharge, she denies recurrent symptoms  No chest pain no palpitations no lightheadedness  Occasionally in the morning she is dizzy  Blood pressure today 98/60  Historically she drinks about 3 cups of coffee a day  On that particular day she recall she did not have any extra water  She normally does hydrate, 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  Occasionally in the morning she is little dizzy  To review, she denies any use of illicit drugs, herbs, energy drinks  No tobacco   No regular alcohol  Family history: No family history sudden cardiac death  On exam she is euvolemic  She does have left-sided periorbital ecchymosis  Her echocardiogram is pending  The event monitor has been mailed to her house and is in transit  I recommended no driving until her evaluation is complete  She also needs to schedule follow-up with Neurology  This is in process  Follow-up with her cardiologist after testing is complete          I have spent 25 minutes with Patient  today in which greater than 50% of this time was spent in counseling/coordination of care regarding Intructions for management, Patient and family education, Importance of tx compliance and Risk factor reductions  Assessment  Diagnoses and all orders for this visit:    Syncope  -     Ambulatory referral to Cardiology    Other orders  -     Discontinue: sulfamethoxazole-trimethoprim (BACTRIM DS) 800-160 mg per tablet;  (Patient not taking: Reported on 1/5/2022 )  -     Discontinue: tamsulosin (FLOMAX) 0 4 mg; Take by mouth (Patient not taking: Reported on 1/5/2022 )          History reviewed  No pertinent past medical history  Review of Systems   Constitutional: Negative for chills  Cardiovascular: Negative for chest pain, claudication, cyanosis, dyspnea on exertion, irregular heartbeat, leg swelling, near-syncope, orthopnea, palpitations, paroxysmal nocturnal dyspnea and syncope     Respiratory: Negative for cough and shortness of breath  Gastrointestinal: Negative for heartburn and nausea  Neurological: Negative for dizziness, focal weakness, headaches, light-headedness and weakness  All other systems reviewed and are negative  Allergies   Allergen Reactions    Penicillins Rash     Pt gets yeast infections from antibiotic        Current Outpatient Medications:     PARoxetine (PAXIL) 30 mg tablet, Take 30 mg by mouth every morning, Disp: , Rfl:         Social History     Socioeconomic History    Marital status: /Civil Union     Spouse name: Not on file    Number of children: Not on file    Years of education: Not on file    Highest education level: Not on file   Occupational History    Not on file   Tobacco Use    Smoking status: Never Smoker    Smokeless tobacco: Never Used   Vaping Use    Vaping Use: Never used   Substance and Sexual Activity    Alcohol use: Not Currently    Drug use: Never    Sexual activity: Not on file   Other Topics Concern    Not on file   Social History Narrative    Not on file     Social Determinants of Health     Financial Resource Strain: Not on file   Food Insecurity: Not on file   Transportation Needs: Not on file   Physical Activity: Not on file   Stress: Not on file   Social Connections: Not on file   Intimate Partner Violence: Not on file   Housing Stability: Not on file       Family History   Problem Relation Age of Onset    Colon cancer Mother     Prostate cancer Father     Aortic aneurysm Father     Diabetes Father     Transient ischemic attack Father     Stroke Paternal Grandfather        Physical Exam  Vitals and nursing note reviewed  Constitutional:       General: She is not in acute distress  Appearance: She is not diaphoretic  HENT:      Head: Normocephalic and atraumatic     Eyes:      Conjunctiva/sclera: Conjunctivae normal       Comments: Left-sided periorbital ecchymosis   Cardiovascular:      Rate and Rhythm: Normal rate and regular rhythm  Pulses: Intact distal pulses  Heart sounds: Normal heart sounds  Pulmonary:      Effort: Pulmonary effort is normal       Breath sounds: Normal breath sounds  Abdominal:      General: Bowel sounds are normal       Palpations: Abdomen is soft  Musculoskeletal:         General: Normal range of motion  Cervical back: Normal range of motion and neck supple  Skin:     General: Skin is warm and dry  Neurological:      Mental Status: She is alert and oriented to person, place, and time  Vitals: Blood pressure 98/70, pulse (!) 54, resp  rate 18, height 5' 9" (1 753 m), weight 59 6 kg (131 lb 8 oz), SpO2 95 %  Wt Readings from Last 3 Encounters:   01/05/22 59 6 kg (131 lb 8 oz)   12/24/21 61 2 kg (135 lb)   03/06/20 59 8 kg (131 lb 13 4 oz)         Labs & Results:  Lab Results   Component Value Date    WBC 5 09 12/24/2021    HGB 13 3 12/24/2021    HCT 38 1 12/24/2021    MCV 89 12/24/2021     12/24/2021     No results found for: BNP  No components found for: CHEM  Total CK   Date Value Ref Range Status   03/06/2020 86 26 - 192 U/L Final     No results found for this or any previous visit  No results found for this or any previous visit  This note was completed in part utilizing [employer] fluency direct voice recognition software  Grammatical errors, random word insertion, spelling mistakes, and incomplete sentences may be an occasional consequence of the system secondary to software limitations, ambient noise and hardware issues  At the time of dictation, efforts were made to edit, clarify and /or correct errors  Please read the chart carefully and recognize, using context, where substitutions have occurred    If you have any questions or concerns about the context, text or information contained within the body of this dictation, please contact myself, the provider, for further clarification

## 2022-01-12 ENCOUNTER — HOSPITAL ENCOUNTER (OUTPATIENT)
Dept: NON INVASIVE DIAGNOSTICS | Facility: HOSPITAL | Age: 57
Discharge: HOME | End: 2022-01-12
Payer: COMMERCIAL

## 2022-01-12 VITALS
HEART RATE: 54 BPM | BODY MASS INDEX: 19.4 KG/M2 | DIASTOLIC BLOOD PRESSURE: 70 MMHG | SYSTOLIC BLOOD PRESSURE: 100 MMHG | HEIGHT: 69 IN | WEIGHT: 131 LBS

## 2022-01-12 DIAGNOSIS — R55 SYNCOPE: ICD-10-CM

## 2022-01-12 LAB
AORTIC ROOT: 2.2 CM
E WAVE DECELERATION TIME: 286 MS
FRACTIONAL SHORTENING: 29 % (ref 28–44)
INTERVENTRICULAR SEPTUM IN DIASTOLE (PARASTERNAL SHORT AXIS VIEW): 0.8 CM
LAAS-AP4: 20.3 CM2
LEFT ATRIUM SIZE: 2.9 CM
LEFT INTERNAL DIMENSION IN SYSTOLE: 3 CM (ref 2.1–4)
LEFT VENTRICULAR INTERNAL DIMENSION IN DIASTOLE: 4.2 CM (ref 3.86–5.74)
LEFT VENTRICULAR POSTERIOR WALL IN END DIASTOLE: 1 CM
LEFT VENTRICULAR STROKE VOLUME: 44 ML
MV E'TISSUE VEL-LAT: 10 CM/S
MV PEAK A VEL: 0.49 M/S
MV PEAK E VEL: 56 CM/S
MV STENOSIS PRESSURE HALF TIME: 0 MS
RIGHT ATRIAL 2D VOLUME: 59 ML
RIGHT ATRIUM AREA SYSTOLE A4C: 19.7 CM2
RIGHT VENTRICLE ID DIMENSION: 3.4 CM
SL CV LV EF: 55
SL CV PED ECHO LEFT VENTRICLE DIASTOLIC VOLUME (MOD BIPLANE) 2D: 79 ML
SL CV PED ECHO LEFT VENTRICLE SYSTOLIC VOLUME (MOD BIPLANE) 2D: 34 ML
TRICUSPID VALVE PEAK REGURGITATION VELOCITY: 1.62 M/S
TV PEAK GRADIENT: 10 MMHG
Z-SCORE OF LEFT VENTRICULAR DIMENSION IN END SYSTOLE: -1.14

## 2022-01-12 PROCEDURE — 93306 TTE W/DOPPLER COMPLETE: CPT

## 2022-01-12 PROCEDURE — 93306 TTE W/DOPPLER COMPLETE: CPT | Performed by: INTERNAL MEDICINE

## 2022-02-04 ENCOUNTER — CLINICAL SUPPORT (OUTPATIENT)
Dept: CARDIOLOGY CLINIC | Facility: CLINIC | Age: 57
End: 2022-02-04
Payer: COMMERCIAL

## 2022-02-04 DIAGNOSIS — R55 SYNCOPE: ICD-10-CM

## 2022-02-04 DIAGNOSIS — R55 SYNCOPE, UNSPECIFIED SYNCOPE TYPE: ICD-10-CM

## 2022-02-04 PROCEDURE — 93248 EXT ECG>7D<15D REV&INTERPJ: CPT | Performed by: INTERNAL MEDICINE

## 2022-02-15 ENCOUNTER — OFFICE VISIT (OUTPATIENT)
Dept: CARDIOLOGY CLINIC | Facility: CLINIC | Age: 57
End: 2022-02-15
Payer: COMMERCIAL

## 2022-02-15 VITALS
OXYGEN SATURATION: 99 % | HEART RATE: 61 BPM | SYSTOLIC BLOOD PRESSURE: 104 MMHG | DIASTOLIC BLOOD PRESSURE: 60 MMHG | WEIGHT: 136 LBS | BODY MASS INDEX: 20.14 KG/M2 | HEIGHT: 69 IN

## 2022-02-15 DIAGNOSIS — R55 SYNCOPE, UNSPECIFIED SYNCOPE TYPE: Primary | ICD-10-CM

## 2022-02-15 DIAGNOSIS — I95.1 ORTHOSTATIC HYPOTENSION: ICD-10-CM

## 2022-02-15 PROCEDURE — 99214 OFFICE O/P EST MOD 30 MIN: CPT | Performed by: INTERNAL MEDICINE

## 2022-02-15 NOTE — PATIENT INSTRUCTIONS
Hypotension   WHAT YOU NEED TO KNOW:   What is hypotension? Hypotension is a condition that causes your blood pressure (BP) to drop lower than it should be  Hypotension may be mild, serious, or life-threatening  What are the most common types of hypotension? · Acute:  Acute hypotension is a sudden drop in your BP that may be life-threatening  · Constitutional:  Constitutional hypotension means your BP is lower than it should be most or all of the time  This is a chronic condition that occurs with no known medical cause  · Orthostatic:  Orthostatic hypotension normally occurs when you stand up from a sitting or lying position  It is also called postural hypotension  · Postprandial:  Postprandial hypotension means your BP becomes too low after you eat a meal  Your BP may drop within 2 hours after you eat, and is more common when you eat meals high in carbohydrates  What causes hypotension? · Anemia or blood loss    · Nervous system, heart, or adrenal disorders    · Dehydration from not drinking enough liquids, frequent vomiting, diarrhea, or severe burns    · Some medicines, such as those used to treat high blood pressure, heart conditions, pain, depression, or cancer    · A blood infection (sepsis)    What increases my risk for hypotension? · Increasing age    · Drug and alcohol use    · Being bedridden for a long period of time    · Low body weight    · Hemodialysis    · Medical conditions such as diabetes, Parkinson disease, and Alzheimer disease    What are the signs and symptoms of hypotension? · Feeling anxious, nauseated, tired, or weak    · Lightheadedness, dizziness, or fainting    · Increased sweating, palpitations (fast, forceful heartbeats), tremors, or a seizure     · Headache or pain in your neck, shoulders, chest, lower back, buttocks, or legs    · Blurred vision or changes in vision    · Confusion, decreased memory, or inability to pay attention    How is hypotension diagnosed? Your healthcare provider will ask about your symptoms, health conditions, and medicines  Tell him how often you have symptoms, and if they change during the day  Tell him if you recently have had diarrhea, vomiting, or blood loss  Your healthcare provider will carefully examine you, listen to your heart, and may check your eyes  He may check your body movements and sensations (ability to feel something that touches you)  You may also need the following:  · Blood pressure testing: This may be done while you lie down, sit, and stand  You may need to wear a BP monitor to record your BP for up to 24 hours  · Tilt table testing: You are secured on a table that changes your position  Your BP is checked when the table moves you into each position  What tests may help find the cause of my hypotension? Many times, hypotension is a symptom of another condition  You may need any of the following tests to find the cause of your hypotension:  · Blood tests:  A sample of your blood or urine may be checked for anemia or other conditions causing your hypotension  · EKG: This test records the electrical activity of your heart  It is used to check for damage or other heart problems that may be causing your hypotension  · Autonomic nervous system tests:  Your healthcare provider may check for changes in how fast your heart beats when you take deep breaths  He may also check for changes in your BP while you put your hand in ice cold water  · 24 hour urine test:  During this test you will need to collect all of your urine for 24 hours  You will urinate into a container and the urine will be put into a jug  The jug will need to be kept cold  If you urinate during the night, you will need to save that urine  Healthcare providers will measure and record how much you urinate  At the end of 24 hours, the urine will be sent to a lab for tests  · Echocardiogram:  This is a type of ultrasound, also called an echo   An ultrasound uses sound waves to show pictures of your heart on a monitor  An ultrasound may be done to show how your heart moves when it beats  How is hypotension treated? Your healthcare provider will work with you to find the cause of your hypotension and help treat your symptoms  You may need the following:  · Compression stockings or abdominal binder: These may help blood return to your heart and decrease your hypotension  · IV fluids: These may be used to increase your BP if you are dehydrated or have blood loss or sepsis  · Medicines:      ? Alpha-adrenoreceptor agonists: These medicines may increase your BP and decrease your symptoms  ? Steroids: This medicine helps prevent salt loss from your body  Steroids may also help increase the amount of fluid in your body and raise your BP  ? Vasopressors: These medicines help constrict (make smaller) your blood vessels and increase your BP  Vasopressor medicines may increase the blood flow to your brain and help decrease your symptoms  ? Antidiuretic hormone: This medicine helps control your BP and helps decrease your need to urinate during the night  ? Antiparkinson medicine: This medicine may help increase your standing BP and decrease your symptoms  What are the risks of hypotension? Without treatment, your symptoms may get worse  You may faint or fall often, which can lead to injuries, such as a broken bone  You may be at increased risk for depression, confusion, and memory problems  Hypotension may cause decreased blood flow to your brain and heart  This may lead to a stroke or heart attack and can be life-threatening  Sepsis-related hypotension is life-threatening without treatment  How can I manage my symptoms? · Change your position slowly:  When you get out of bed, sit up first, then slowly move your legs to the side of the bed  If you are not having any symptoms, slowly stand up   If you have symptoms, sit down right away     · Avoid straining:  Activities and movements that cause you to strain can cause a drop in your BP  Activities to avoid include lifting, coughing, and other movements that increase the feeling of pressure in your chest     · Avoid the heat: This can cause a decrease in your BP  Stay inside during very hot days, or limit the amount of time you are outside  Do not take hot baths  · Exercise and do physical counter maneuvers:  Ask your healthcare provider about the best exercise plan for you  Physical counter maneuvers may help to increase your BP and increase blood flow to your heart  They include crossing your legs, squatting, and bending at the waist  You can also rise up on your toes while you are standing, and tighten your thigh muscles  · Drink liquids as directed:  Ask your healthcare provider how much liquid to drink each day and which liquids are best for you  Drink 500 milliliters (½ liter) of liquid quickly in the morning or before meals to help increase your BP  Your healthcare provider may tell you to drink 2 cups of coffee with, or after, breakfast and lunch  The caffeine in the coffee can help prevent a drop in your BP  Your healthcare provider may also give you caffeine pills  · Change how you eat meals: If your BP drops after you eat large meals, try to eat smaller meals more often  Eat foods low in carbohydrates and cholesterol to help prevent BP drops after you eat  Ask if you need to increase the amount of sodium (salt) you eat each day  · Raise the head of your bed:  Raise the head of your bed 4 to 8 inches  This can help prevent morning BP drops and decrease the need to urinate during the night  · Do not drink alcohol:  Alcohol can make your symptoms worse  Ask for information if you need help quitting  When should I contact my healthcare provider? · You vomit several times or have diarrhea, and you cannot drink liquid  · You have a fever       · You have new or increased symptoms, such as dizziness, weakness, or fainting  · Your legs, ankles, and feet are swollen, or you gain weight for no known reason  · You have questions or concerns about your condition or care  When should I seek immediate care or call 911? · You become confused or cannot speak  · You urinate very little or not at all  · You have a seizure  · You have chest pain or trouble breathing  · You have changes in vision or cannot see  CARE AGREEMENT:   You have the right to help plan your care  Learn about your health condition and how it may be treated  Discuss treatment options with your healthcare providers to decide what care you want to receive  You always have the right to refuse treatment  The above information is an  only  It is not intended as medical advice for individual conditions or treatments  Talk to your doctor, nurse or pharmacist before following any medical regimen to see if it is safe and effective for you  © Copyright [employer] 2021 Information is for End User's use only and may not be sold, redistributed or otherwise used for commercial purposes   All illustrations and images included in CareNotes® are the copyrighted property of [employer]  or [address] [place] [address]

## 2022-02-15 NOTE — PROGRESS NOTES
Cardiology Follow Up    Yvrose Roper  1965  [id number]  [facility]  [address]  [facility] [id number] [phone]    1  Syncope, unspecified syncope type     2  Orthostatic hypotension       Chief Complaint   Patient presents with    Follow-up     1 month per Milas Elliott    Dizziness     bending over  sitting to standing  getting out of bed in morning        Interval History: Patient is here for continued follow up after recent ER visit for syncope  She still has some dizziness when she goes from a sitting to standing position, especially getting out of bed in the morning  At the episode of syncope, she was found face down by her son with a laceration on her forehead  No reported chest pain, shortness of breath, palpitations, syncope, LE edema, orthopnea, PND, or significant weight changes  Patient remains active without any increased fatigue out of the ordinary  Patient Active Problem List   Diagnosis    Syncope    Laceration of forehead    Orthostatic hypotension     History reviewed  No pertinent past medical history    Social History     Socioeconomic History    Marital status: /Civil Union     Spouse name: Not on file    Number of children: Not on file    Years of education: Not on file    Highest education level: Not on file   Occupational History    Not on file   Tobacco Use    Smoking status: Never Smoker    Smokeless tobacco: Never Used   Vaping Use    Vaping Use: Never used   Substance and Sexual Activity    Alcohol use: Yes     Comment: social    Drug use: Never    Sexual activity: Not on file   Other Topics Concern    Not on file   Social History Narrative    Not on file     Social Determinants of Health     Financial Resource Strain: Not on file   Food Insecurity: Not on file   Transportation Needs: Not on file   Physical Activity: Not on file   Stress: Not on file Social Connections: Not on file   Intimate Partner Violence: Not on file   Housing Stability: Not on file      Family History   Problem Relation Age of Onset    Colon cancer Mother     Prostate cancer Father     Aortic aneurysm Father     Diabetes Father     Transient ischemic attack Father     Stroke Paternal Grandfather      Past Surgical History:   Procedure Laterality Date    AUGMENTATION BREAST      LAPAROTOMY         Current Outpatient Medications:     PARoxetine (PAXIL) 30 mg tablet, Take 30 mg by mouth every morning, Disp: , Rfl:   Allergies   Allergen Reactions    Penicillins Rash     Pt gets yeast infections from antibiotic        Labs:  Hospital Outpatient Visit on 01/12/2022   Component Date Value    LA size 01/12/2022 2 9     TV peak gradient 01/12/2022 10     Tricuspid valve peak reg* 01/12/2022 1 62     LVPWd 01/12/2022 1 00     Left Atrium Area-systoli* 01/12/2022 20 3     MV E' Tissue Velocity La* 01/12/2022 10     RA 2D Volume 01/12/2022 59 0     IVSd 01/12/2022 8 38     LV DIASTOLIC VOLUME (MOD* 92/47/2403 79     LEFT VENTRICLE SYSTOLIC * 79/33/8767 34     Left ventricular stroke * 01/12/2022 44 00     LVIDd 01/12/2022 4 20     LVIDS 01/12/2022 3 00     FS 01/12/2022 29     Ao root 01/12/2022 2 20     RVID d 01/12/2022 3 4     E wave deceleration time 01/12/2022 286     MV Peak E Evan 01/12/2022 56     MV Peak A Evan 01/12/2022 0 49     RAA A4C 01/12/2022 19 7     MV stenosis pressure 1/2* 01/12/2022 0     ZLVIDS 01/12/2022 -1 14     LV EF 01/12/2022 55    Admission on 12/24/2021, Discharged on 12/25/2021   Component Date Value    WBC 12/24/2021 5 09     RBC 12/24/2021 4 29     Hemoglobin 12/24/2021 13 3     Hematocrit 12/24/2021 38 1     MCV 12/24/2021 89     MCH 12/24/2021 31 0     MCHC 12/24/2021 34 9     RDW 12/24/2021 12 3     MPV 12/24/2021 9 1     Platelets 22/65/3415 184     nRBC 12/24/2021 0     Neutrophils Relative 12/24/2021 53     Immat GRANS % 12/24/2021 0     Lymphocytes Relative 12/24/2021 33     Monocytes Relative 12/24/2021 9     Eosinophils Relative 12/24/2021 4     Basophils Relative 12/24/2021 1     Neutrophils Absolute 12/24/2021 2 72     Immature Grans Absolute 12/24/2021 0 02     Lymphocytes Absolute 12/24/2021 1 67     Monocytes Absolute 12/24/2021 0 46     Eosinophils Absolute 12/24/2021 0 18     Basophils Absolute 12/24/2021 0 04     NT-proBNP 12/24/2021 98     D-Dimer, Quant 12/24/2021 0 73*    hs TnI 0hr 12/24/2021 8     TSH 3RD GENERATON 12/24/2021 1 257     Sodium 12/24/2021 139     Potassium 12/24/2021 3 8     Chloride 12/24/2021 104     CO2 12/24/2021 28     ANION GAP 12/24/2021 7     BUN 12/24/2021 20     Creatinine 12/24/2021 0 94     Glucose 12/24/2021 108     Calcium 12/24/2021 9 3     AST 12/24/2021 20     ALT 12/24/2021 25     Alkaline Phosphatase 12/24/2021 50     Total Protein 12/24/2021 7 4     Albumin 12/24/2021 3 9     Total Bilirubin 12/24/2021 1 40*    eGFR 12/24/2021 68     Magnesium 12/24/2021 2 0     SARS-CoV-2 12/24/2021 Negative     INFLUENZA A PCR 12/24/2021 Negative     INFLUENZA B PCR 12/24/2021 Negative     RSV PCR 12/24/2021 Negative     hs TnI 2hr 12/24/2021 15     Delta 2hr hsTnI 12/24/2021 7     hs TnI 4hr 12/24/2021 21     Delta 4hr hsTnI 12/24/2021 13     hs TnI 0hr 12/24/2021 19     Color, UA 12/25/2021 Light Yellow     Clarity, UA 12/25/2021 Clear     Specific Gravity, UA 12/25/2021 1 015     pH, UA 12/25/2021 6 0     Leukocytes, UA 12/25/2021 Trace*    Nitrite, UA 12/25/2021 Negative     Protein, UA 12/25/2021 Negative     Glucose, UA 12/25/2021 Negative     Ketones, UA 12/25/2021 Negative     Urobilinogen, UA 12/25/2021 0 2     Bilirubin, UA 12/25/2021 Negative     Blood, UA 12/25/2021 Trace-Intact*    hs TnI 2hr 12/24/2021 20     Delta 2hr hsTnI 12/24/2021 1     hs TnI 4hr 12/25/2021 18     Delta 4hr hsTnI 12/25/2021 -1     Sodium 12/25/2021 140     Potassium 12/25/2021 4 0     Chloride 12/25/2021 107     CO2 12/25/2021 29     ANION GAP 12/25/2021 4     BUN 12/25/2021 20     Creatinine 12/25/2021 0 86     Glucose 12/25/2021 93     Glucose, Fasting 12/25/2021 93     Calcium 12/25/2021 9 1     eGFR 12/25/2021 76     Magnesium 12/25/2021 2 0     Cholesterol 12/25/2021 164     Triglycerides 12/25/2021 42     HDL, Direct 12/25/2021 74     LDL Calculated 12/25/2021 82     RBC, UA 12/25/2021 1-2     WBC, UA 12/25/2021 4-10*    Epithelial Cells 12/25/2021 None Seen     Bacteria, UA 12/25/2021 Occasional     MUCUS THREADS 12/25/2021 Occasional*    Ventricular Rate 12/24/2021 74     Atrial Rate 12/24/2021 77     W[place] Interval 12/24/2021 158     QRSD Interval 12/24/2021 74     QT Interval 12/24/2021 370     QTC Interval 12/24/2021 411     P Axis 12/24/2021 23     QRS Axis 12/24/2021 44     T Wave Axis 12/24/2021 51      Lab Results   Component Value Date    TRIG 42 12/25/2021    HDL 74 12/25/2021     Imaging: No results found  Review of Systems:  Review of Systems   Constitutional: Negative for activity change, appetite change, chills, diaphoresis, fatigue and unexpected weight change  HENT: Negative for hearing loss, nosebleeds and sore throat  Eyes: Negative for photophobia and visual disturbance  Respiratory: Negative for cough, chest tightness, shortness of breath and wheezing  Cardiovascular: Negative for chest pain, palpitations and leg swelling  Gastrointestinal: Negative for abdominal pain, diarrhea, nausea and vomiting  Endocrine: Negative for polyuria  Genitourinary: Negative for dysuria, frequency and hematuria  Musculoskeletal: Negative for arthralgias, back pain, gait problem and neck pain  Skin: Negative for pallor and rash  Neurological: Positive for dizziness  Negative for syncope and headaches  Hematological: Does not bruise/bleed easily     Psychiatric/Behavioral: Negative for behavioral problems and confusion  Physical Exam:  Physical Exam  Vitals reviewed  Constitutional:       Appearance: She is well-developed  She is not diaphoretic  HENT:      Head: Normocephalic and atraumatic  Nose: Nose normal    Eyes:      General: No scleral icterus  Pupils: Pupils are equal, round, and reactive to light  Neck:      Vascular: No JVD  Cardiovascular:      Rate and Rhythm: Normal rate and regular rhythm  Heart sounds: Normal heart sounds  No murmur heard  No friction rub  No gallop  Pulmonary:      Effort: Pulmonary effort is normal  No respiratory distress  Breath sounds: Normal breath sounds  No wheezing or rales  Abdominal:      General: Bowel sounds are normal  There is no distension  Palpations: Abdomen is soft  Tenderness: There is no abdominal tenderness  Musculoskeletal:         General: No deformity  Normal range of motion  Cervical back: Normal range of motion and neck supple  Skin:     General: Skin is warm and dry  Findings: No rash  Neurological:      Mental Status: She is alert and oriented to person, place, and time  Cranial Nerves: No cranial nerve deficit  Psychiatric:         Behavior: Behavior normal        Blood pressure 104/60, pulse 61, height 5' 9" (1 753 m), weight 61 7 kg (136 lb), SpO2 99 %  Discussion/Summary:  Syncope: she had a 2 week Zio patch monitor revealing brief runs of atrial tachycardia (less than 9 beats in duration) along with ventricular bigeminy that her symptoms were correlated with  Her heart rate varied between 47 bpm to 161 bpm - so it would not be advisable to start her on a B-blocker  Overall PVC burden was low  No significant structural heart disease seen on echo  Perhaps could be related to an orthostatic cause  Advised compression stockings and taking her time to change positions along with hydration to prevent future episodes    She will also need neurology follow up for Chiari malformation seen on brain imaging

## 2022-02-21 ENCOUNTER — OFFICE VISIT (OUTPATIENT)
Dept: OBGYN CLINIC | Facility: HOSPITAL | Age: 57
End: 2022-02-21
Payer: COMMERCIAL

## 2022-02-21 ENCOUNTER — HOSPITAL ENCOUNTER (OUTPATIENT)
Dept: RADIOLOGY | Facility: HOSPITAL | Age: 57
Discharge: HOME | End: 2022-02-21
Attending: SURGERY
Payer: COMMERCIAL

## 2022-02-21 VITALS
HEART RATE: 67 BPM | WEIGHT: 130 LBS | BODY MASS INDEX: 19.7 KG/M2 | DIASTOLIC BLOOD PRESSURE: 70 MMHG | SYSTOLIC BLOOD PRESSURE: 103 MMHG | RESPIRATION RATE: 16 BRPM | HEIGHT: 68 IN

## 2022-02-21 DIAGNOSIS — M18.12 ARTHRITIS OF CARPOMETACARPAL (CMC) JOINT OF LEFT THUMB: ICD-10-CM

## 2022-02-21 DIAGNOSIS — M79.645 PAIN OF LEFT THUMB: ICD-10-CM

## 2022-02-21 DIAGNOSIS — M79.644 PAIN OF RIGHT THUMB: ICD-10-CM

## 2022-02-21 DIAGNOSIS — M18.11 ARTHRITIS OF CARPOMETACARPAL (CMC) JOINT OF RIGHT THUMB: Primary | ICD-10-CM

## 2022-02-21 PROCEDURE — 20600 DRAIN/INJ JOINT/BURSA W/O US: CPT | Performed by: SURGERY

## 2022-02-21 PROCEDURE — 99204 OFFICE O/P NEW MOD 45 MIN: CPT | Performed by: SURGERY

## 2022-02-21 PROCEDURE — 73140 X-RAY EXAM OF FINGER(S): CPT

## 2022-02-21 RX ORDER — BUPIVACAINE HYDROCHLORIDE 2.5 MG/ML
0.5 INJECTION, SOLUTION INFILTRATION; PERINEURAL
Status: COMPLETED | OUTPATIENT
Start: 2022-02-21 | End: 2022-02-21

## 2022-02-21 RX ORDER — TRIAMCINOLONE ACETONIDE 40 MG/ML
20 INJECTION, SUSPENSION INTRA-ARTICULAR; INTRAMUSCULAR
Status: COMPLETED | OUTPATIENT
Start: 2022-02-21 | End: 2022-02-21

## 2022-02-21 RX ADMIN — TRIAMCINOLONE ACETONIDE 20 MG: 40 INJECTION, SUSPENSION INTRA-ARTICULAR; INTRAMUSCULAR at 08:49

## 2022-02-21 RX ADMIN — BUPIVACAINE HYDROCHLORIDE 0.5 ML: 2.5 INJECTION, SOLUTION INFILTRATION; PERINEURAL at 08:49

## 2022-02-21 NOTE — PATIENT INSTRUCTIONS
What is it [facility] Arthritis? In a normal joint, cartilage covers the end of the bones and serves as a shock absorber to allow smooth, pain-free movement  In osteoarthritis (OA, or degenerative arthritis) the cartilage layer wears out, resulting in direct contact between the bones and producing pain and deformity  One of the most common joints to develop OA in the hand is the base of the thumb  The thumb basal joint, also called the carpometacarpal Sullivan) joint, is a specialized saddle shaped joint that is formed by a small bone of the wrist (trapezium) and the first bone of the thumb (metacarpal)  The saddle shaped joint allows the thumb to have a wide range of motions, including up, down, across the palm, and the ability to pinch (see Figure 1)  Who gets it? OA at the base of the thumb is more commonly seen in women over the age of 36  The exact cause is unknown, but genetics, previous injuries such as fractures or dislocations, and generalized joint laxity may predispose towards development of this type of arthritis  What are the symptoms and signs? The most common symptom is pain at the base of the thumb  The pain can be aggravated by activities that require pinching, such as opening jars, turning door knobs or keys, and writing  Severity can also progress to pain at rest and pain at night  In more severe cases, progressive destruction and mal-alignment of the joint occurs, and a bump develops at the base of the thumb as the metacarpal moves out of the saddle joint  This shift in the joint can cause limited motion and weakness, making pinch difficult (see Figure 2)  The next joint above the [facility] may compensate by loosening, causing it to bend further back (hyperextension)  How is the diagnosis made? The diagnosis is made by history and physical evaluation  Pressure and movement such as twisting will produce pain at the joint  A grinding sensation may also be present at the joint (see Figure 3)  X-rays are used to confirm the diagnosis, although symptom severity often does not correlate with x-ray findings  What are the treatment options? Less severe thumb arthritis will usually respond to non-surgical care  Arthritis medication, splinting and limited cortisone injections may help alleviate pain  A hand therapist might provide a variety of rigid and non-rigid splints which can be used while sleeping or during activities  Patients with advanced disease or who fail non-surgical treatment may be candidates for surgical reconstruction  A variety of surgical techniques are available that can successfully reduce or eliminate pain  Surgical procedures include removal of arthritic bone and joint reconstruction (arthroplasty), joint fusion, bone realignment, and even arthroscopy in select cases  A consultation with your hand surgeon can help decide the best option for you (see Figure 4)  © 2012 American Society for [employer] of the Hand  www handcare  org

## 2022-02-21 NOTE — PROGRESS NOTES
Preston MACHADO  Attending, Orthopaedic Surgery  Hand, Wrist, and Elbow Surgery  [facility]      ORTHOPAEDIC HAND, WRIST, AND ELBOW OFFICE  VISIT       ASSESSMENT/PLAN:      66 yo female with bilateral CMC arthritis   We reviewed xrays in office today which confirm BL cmc arthritis  Discussed initial treatment options including anti-inflammatories, bracing, and injections  Risks benefits and alternatives discussed and patient elected to proceed with initial bilateral CMC injections which were performed in office today  She may get these done every 3 months for as long as they help  Bilateral comfort cool braces also ordered today as well  Follow up in 3 months  The patient verbalized understanding of exam findings and treatment plan  We engaged in the shared decision-making process and treatment options were discussed at length with the patient  Surgical and conservative management discussed today along with risks and benefits  Diagnoses and all orders for this visit:    Arthritis of carpometacarpal [facility]) joint of right thumb  -     XR thumb right first digit-min 2v; Future  -     Small joint arthrocentesis    Arthritis of carpometacarpal (CMC) joint of left thumb  -     XR thumb left first digit-min 2v; Future  -     Small joint arthrocentesis        Follow Up:  Return in about 3 months (around 5/21/2022) for Recheck  To Do Next Visit:  Re-evaluation of current issue      General Discussions:  [facility] Arthritis: The anatomy and physiology of carpometacarpal joint arthritis was discussed with the patient today in the office  Deterioration of the articular cartilage eventually leads to hypermobility at the thumb [facility] joint, resulting in joint subluxation, osteophyte formation, cystic changes within the trapezium and base of the first metacarpal, as well as subchondral sclerosis    Eventually, pain, limited mobility, and compensatory hyperextension at the metacarpophalangeal joint may develop  While normal activity and usage of the thumb joint may provide a painful experience to the patient, this typically does not result in damage to the thumb or hand  Treatment options include resting thumb spica splints to decreased joint edema, pain, and inflammation  Therapy exercises to strengthen the thenar musculature may relieve pain, but do not alter the overall continued development of osteoarthritis  Oral medications, topical medications, corticosteroid injections may decrease pain and increase overall function  Eventually, approximately 5% of patients may require surgical intervention  ____________________________________________________________________________________________________________________________________________      CHIEF COMPLAINT:  Chief Complaint   Patient presents with    Left Thumb - Pain    Right Thumb - Pain       SUBJECTIVE:  Pavan Ruiz is a 65y o  year old RHD female who presents for evaluation of bilateral thumbs  She notes pain at the base of both thumbs worse with pinching and grabbing  She has tried voltaren gel and therapy, neither of which have helped much  She denies any clicking or locking, no paresthesias, no additional complaints         Pain/symptom timing:  Worse during the day when active  Pain/symptom context:  Worse with activites and work  Pain/symptom modifying factors:  Rest makes better, activities make worse  Pain/symptom associated signs/symptoms: none    Prior treatment   · NSAIDsYes   · Injections No   · Bracing/Orthotics No    Physical Therapy Yes     I have personally reviewed all the relevant PMH, PSH, SH, FH, Medications and allergies      PAST MEDICAL HISTORY:  Past Medical History:   Diagnosis Date    Known health problems: none        PAST SURGICAL HISTORY:  Past Surgical History:   Procedure Laterality Date    AUGMENTATION BREAST      LAPAROTOMY         FAMILY HISTORY:  Family History   Problem Relation Age of Onset    Colon cancer Mother     Prostate cancer Father     Aortic aneurysm Father     Diabetes Father     Transient ischemic attack Father     Stroke Paternal Grandfather        SOCIAL HISTORY:  Social History     Tobacco Use    Smoking status: Never Smoker    Smokeless tobacco: Never Used   Vaping Use    Vaping Use: Never used   Substance Use Topics    Alcohol use: Yes     Comment: social    Drug use: Never       MEDICATIONS:    Current Outpatient Medications:     PARoxetine (PAXIL) 30 mg tablet, Take 30 mg by mouth every morning, Disp: , Rfl:     ALLERGIES:  Allergies   Allergen Reactions    Penicillins Rash     Pt gets yeast infections from antibiotic            REVIEW OF SYSTEMS:  Review of Systems   Constitutional: Negative for chills, fatigue and fever  HENT: Negative for hearing loss, nosebleeds and sore throat  Eyes: Negative for redness and visual disturbance  Respiratory: Negative for cough, shortness of breath and wheezing  Cardiovascular: Negative for chest pain, palpitations and leg swelling  Gastrointestinal: Negative for abdominal pain, nausea and vomiting  Endocrine: Negative for polydipsia and polyuria  Genitourinary: Negative for difficulty urinating, dysuria and hematuria  Musculoskeletal: Positive for arthralgias  Negative for joint swelling and myalgias  Skin: Negative for rash and wound  Neurological: Negative for speech difficulty, weakness, numbness and headaches  Psychiatric/Behavioral: Negative for decreased concentration and suicidal ideas  The patient is not nervous/anxious          VITALS:  Vitals:    02/21/22 0811   BP: 103/70   Pulse: 67   Resp: 16       LABS:  HgA1c: No results found for: HGBA1C  BMP:   Lab Results   Component Value Date    GLUCOSE 101 05/18/2014    CALCIUM 9 1 12/25/2021     05/18/2014    K 4 0 12/25/2021    CO2 29 12/25/2021     12/25/2021    BUN 20 12/25/2021    CREATININE 0 86 12/25/2021       _____________________________________________________  PHYSICAL EXAMINATION:  General: well developed and well nourished, alert, oriented times 3 and appears comfortable  Psychiatric: Normal  HEENT: Normocephalic, Atraumatic Trachea Midline, No torticollis  Pulmonary: No audible wheezing or respiratory distress   Abdomen/GI: Non tender, non distended   Cardiovascular: No pitting edema, 2+ radial pulse   Skin: No masses, erythema, lacerations, fluctation, ulcerations  Neurovascular: Sensation Intact to the Median, Ulnar, Radial Nerve, Motor Intact to the Median, Ulnar, Radial Nerve and Pulses Intact  Musculoskeletal: Normal, except as noted in detailed exam and in HPI  MUSCULOSKELETAL EXAMINATION:  right CMC Exam:  No adduction contracture  No hyperextension deformity of MCP joint  Positive localized tenderness over radial and dorsal aspect of thumb (CMC joint)  Grind test is Positive for pain and Negative for crepitus  Metacarpal load shift test positive   No triggering or tenderness over the A1 pulley  No pain with Finkelsteins maneuver     Left CMC Exam:  No adduction contracture  No hyperextension deformity of MCP joint  Positive localized tenderness over radial and dorsal aspect of thumb (CMC joint)  Grind test is Positive for pain and Negative for crepitus  Metacarpal load shift test positive   No triggering or tenderness over the A1 pulley  No pain with Finkelsteins maneuver   ___________________________________________________  STUDIES REVIEWED:  I have personally reviewed AP lateral and oblique radiographs of bilateral thumbs which demonstrate moderate CMC arthritis bilaterally            PROCEDURES PERFORMED:  Small joint arthrocentesis: bilateral thumb CMC  Universal Protocol:  Consent: Verbal consent obtained    Risks and benefits: risks, benefits and alternatives were discussed  Consent given by: patient  Time out: Immediately prior to procedure a "time out" was called to verify the correct patient, procedure, equipment, support staff and site/side marked as required  Patient understanding: patient states understanding of the procedure being performed  Site marked: the operative site was marked  Radiology Images displayed and confirmed   If images not available, report reviewed: imaging studies available  Required items: required blood products, implants, devices, and special equipment available  Patient identity confirmed: verbally with patient    Supporting Documentation  Indications: pain   Procedure Details  Location: thumb - bilateral thumb CMC  Needle size: 25 G  Ultrasound guidance: no  Approach: dorsal    Medications (Right): 0 5 mL bupivacaine 0 25 %; 20 mg triamcinolone acetonide 40 mg/mLMedications (Left): 0 5 mL bupivacaine 0 25 %; 20 mg triamcinolone acetonide 40 mg/mL   Patient tolerance: patient tolerated the procedure well with no immediate complications  Dressing:  Sterile dressing applied             _____________________________________________________      Scribe Attestation    I,:  Cindy Lundberg PA-C am acting as a scribe while in the presence of the attending physician :       I,:  Sherri Haywood MD personally performed the services described in this documentation    as scribed in my presence :

## 2022-03-14 ENCOUNTER — TELEPHONE (OUTPATIENT)
Dept: NEUROLOGY | Facility: CLINIC | Age: 57
End: 2022-03-14

## 2022-05-13 ENCOUNTER — TELEPHONE (OUTPATIENT)
Dept: NEUROLOGY | Facility: CLINIC | Age: 57
End: 2022-05-13

## 2022-05-13 NOTE — TELEPHONE ENCOUNTER
LVM informing pt Dr Gareth Jefferson was summoned for jury duty on 6/21 and we need to reschedule her appt, offering 6/8 at 11am in [facility]

## 2022-05-16 NOTE — TELEPHONE ENCOUNTER
2ND ATTEMPT  Offered 5/18 at 11am and 6/8 at 11am  Slots on hold for 6/21st patients if pt returns call those dates are available

## 2022-05-17 NOTE — TELEPHONE ENCOUNTER
Patient called back and asked to be reschedule with the June 8th date and I rescheduled her for 6-8-22 at 10 am and she accepted

## 2022-05-25 ENCOUNTER — TELEPHONE (OUTPATIENT)
Dept: NEUROLOGY | Facility: CLINIC | Age: 57
End: 2022-05-25

## 2022-05-25 NOTE — TELEPHONE ENCOUNTER
Left voicemail regarding confirming upcoming appointment  Informed pt of time/date/location  Instructed to call our office back for confirmation  Instructed pt to obtain any Neurological records for upcoming appointment

## 2022-06-08 ENCOUNTER — CONSULT (OUTPATIENT)
Dept: NEUROLOGY | Facility: CLINIC | Age: 57
End: 2022-06-08
Payer: COMMERCIAL

## 2022-06-08 VITALS
WEIGHT: 129 LBS | HEART RATE: 64 BPM | BODY MASS INDEX: 19.11 KG/M2 | SYSTOLIC BLOOD PRESSURE: 98 MMHG | DIASTOLIC BLOOD PRESSURE: 56 MMHG | HEIGHT: 69 IN

## 2022-06-08 DIAGNOSIS — R55 SYNCOPE: ICD-10-CM

## 2022-06-08 DIAGNOSIS — R55 SYNCOPE AND COLLAPSE: ICD-10-CM

## 2022-06-08 PROBLEM — F32.A DEPRESSION: Status: ACTIVE | Noted: 2022-06-08

## 2022-06-08 PROCEDURE — 99244 OFF/OP CNSLTJ NEW/EST MOD 40: CPT | Performed by: PSYCHIATRY & NEUROLOGY

## 2022-06-08 NOTE — ASSESSMENT & PLAN NOTE
Overall, her episode is most consistent with an episode of syncope  She had prodrome of heart palpitations before she lost consciousness, but was not noted to have any abnormal shaking  She was confused afterwards, but does not have any other seizure risk factors or prior seizures  Overall, I am most concerned this was an episode of syncope and unrelated to epileptic seizures  --To better evaluate for the etiology of her episode, I will have her get an MRI of her brain and a routine EEG     --her CT head did show possible low-lying cerebellar tonsils, and raise the possibility of a Chiari malformation  Even if present, this is unlikely to be related to her presenting symptoms, but certainly will be fully evaluated with an MRI of her brain, as ordered above

## 2022-06-08 NOTE — PROGRESS NOTES
Patient ID: Mary Tubbs is a 65 y o  female with syncope, who is presenting to Neurology office for evaluation of her spell of syncope and abnormal head CT  Assessment/Plan:    Syncope  Overall, her episode is most consistent with an episode of syncope  She had prodrome of heart palpitations before she lost consciousness, but was not noted to have any abnormal shaking  She was confused afterwards, but does not have any other seizure risk factors or prior seizures  Overall, I am most concerned this was an episode of syncope and unrelated to epileptic seizures  --To better evaluate for the etiology of her episode, I will have her get an MRI of her brain and a routine EEG     --her CT head did show possible low-lying cerebellar tonsils, and raise the possibility of a Chiari malformation  Even if present, this is unlikely to be related to her presenting symptoms, but certainly will be fully evaluated with an MRI of her brain, as ordered above  She will Return in about 4 months (around 10/8/2022)  Subjective:    HPI  Current medications as per Epic    Briefly reviewing her history, on Christmas eve 2021, she was baking  She had just sat down and while sitting, she felt like her heart was racing and her finger tips were numb  She thinks she fell asleep and the next thing she remembers was her son getting her up off the floor  Her son noted that she had gotten up and walked into the kitchen  Her son had heard her fall  He found her laying on the ground, unconscious  He didn't see any shaking  No tongue bite or incontinence  She was talking odd and saying odd things for a period of time after  She did hit her head  She did see Cardiologist after her hospitalization  She had an echocardiogram and a 2 week heart monitor  No clear cause of her episode was found and she was recommended to continue to stay well hydrated       She will get some dizzy spells where she kamille feels like she is intoxicated  Typically, this is just in the morning when she first gets out of bed  This last occurred about a month ago and she felt like this lasted for a few days  Typically, this is was once a month prior to her fall, but she only had one episode since December  This would happen if she would stand up quickly or get some blurred vision when standing up  Now, since her fall, this sensation can last longer and even occur is she turns her head quickly  Special Features  Status epilepticus: none  Self Injury Seizures: hit head with syncopal   Precipitating Factors: none    Epilepsy Risk Factors:  Abnormal pregnancy:    no  Abnormal birth/:   no  Abnormal Development:   no  Febrile seizures, simple:   no  Febrile seizures, complex:   no  CNS infection:    no  Intellectual disability:    no  Cerebral palsy:    no  Head injury (moderate/severe):  no  CNS neoplasm:    no  CNS malformation:    no  Neurosurgical procedure:   no  Stroke:     no  Alcohol abuse:    no  Drug abuse:     no  Family history Sz/epilepsy:   no    Prior AEDs:  none    Prior Evaluation:  - CT Brain: 2021: No acute intracranial abnormality  Incidental low-lying cerebellar tonsils with crowding at the foramen magnum  Cannot exclude a Chiari malformation  - MRI brain: none  - Routine EEG: none  - Ambulatory EEG: none  - Video EEG: none  - PET scan brain : none  - Neuropsychologic testing: none    Psychiatric History:  Depression: yes  Anxiety: yes  Psychosis: no  Psychiatric Admissions: no    I reviewed prior notes including prior hospitalization notes and Cardiology[facility] and summarized above       The following portions of the patient's history were reviewed and updated as appropriate: allergies, current medications, past family history, past medical history, past social history, past surgical history and problem list      Objective:    Blood pressure 98/56, pulse 64, height 5' 9" (1 753 m), weight 58 5 kg (129 lb)  Physical Exam    Neurological Exam  GENERAL EXAMINATION:   In general patient is well appearing and in no distress  There is no peripheral edema  NEUROLOGIC EXAMINATION:     Alert and oriented to person, date, location  Fund of knowledge is full with good understanding of medical situation  Recent and remote memory were intact    Mood and affect are appropriate  Attention is intact  Language function including fluency, naming, and comprehension intact  Cranial nerves: Pupils are equal round reactive to light and accommodation  Visual Fields are full to confrontation bilaterally  Extraocular movements are intact without nystagmus  Facial sensation is intact to light touch  No facial droop, face activates symmetrically  There is no dysarthria  Hearing was intact to finger rub  Tongue and uvula are midline and palate elevates symmetrically  Shoulder shrug  5/5  Motor Exam:  No pronator drift  Bulk and tone are normal  Strength is 5/5 throughout  Deep tendon reflexes: Biceps 2+, brachioradialis 2+, patellar 2+, Achilles 2+ bilaterally  Sensation: Intact light touch    Coordination: Finger nose finger and heel to shin testing are without dysmetria  Gait: Negative romberg  Normal casual gait  ROS:    Review of Systems   Constitutional: Negative  Negative for appetite change and fever  HENT: Negative  Negative for hearing loss, tinnitus, trouble swallowing and voice change  Eyes: Negative  Negative for photophobia and pain  Respiratory: Negative  Negative for shortness of breath  Cardiovascular: Negative  Negative for palpitations  Gastrointestinal: Negative  Negative for nausea and vomiting  Endocrine: Negative  Negative for cold intolerance  Genitourinary: Negative  Negative for dysuria, frequency and urgency  Musculoskeletal: Positive for neck pain (radiates up head with sharp pain)  Negative for myalgias  Skin: Negative    Negative for rash    Neurological: Positive for dizziness (frequently) and syncope (manuel bob)  Negative for tremors, seizures, facial asymmetry, speech difficulty, weakness, light-headedness, numbness and headaches  Hematological: Negative  Does not bruise/bleed easily  Psychiatric/Behavioral: Negative  Negative for confusion, hallucinations and sleep disturbance  All other systems reviewed and are negative  I personally reviewed the ROS that was entered by the medical assistant    Voice recognition software was used in the generation of this note  There may be unintentional errors including grammatical errors, spelling errors, or pronoun errors

## 2022-06-08 NOTE — PATIENT INSTRUCTIONS
-- Please get an MRI of your brain and an EEG before your next appointment  -- Please call our office if any problems arise

## 2022-07-07 ENCOUNTER — HOSPITAL ENCOUNTER (OUTPATIENT)
Dept: NEUROLOGY | Facility: CLINIC | Age: 57
Discharge: HOME | End: 2022-07-07
Attending: PSYCHIATRY & NEUROLOGY
Payer: COMMERCIAL

## 2022-07-07 DIAGNOSIS — R55 SYNCOPE: ICD-10-CM

## 2022-07-07 PROCEDURE — 95816 EEG AWAKE AND DROWSY: CPT

## 2022-07-07 PROCEDURE — 95819 EEG AWAKE AND ASLEEP: CPT | Performed by: STUDENT IN AN ORGANIZED HEALTH CARE EDUCATION/TRAINING PROGRAM

## 2022-07-08 DIAGNOSIS — R55 SYNCOPE, UNSPECIFIED SYNCOPE TYPE: Primary | ICD-10-CM

## 2022-07-15 ENCOUNTER — TELEPHONE (OUTPATIENT)
Dept: NEUROLOGY | Facility: CLINIC | Age: 57
End: 2022-07-15

## 2022-07-15 NOTE — TELEPHONE ENCOUNTER
Left detailed message for pt (okay per communication consent) making her aware  Provided phone # for CS so that she may schedule testing

## 2022-07-15 NOTE — TELEPHONE ENCOUNTER
----- Message from Willy Lennox, MD sent at 7/8/2022  4:21 PM EDT -----  Please call patient about recent routine EEG result, which was normal  It would be best to next get a sleep deprived EEG to get another sample of her brain waves and look for possible abnormality  I am placing an order for this test now

## 2022-10-12 PROBLEM — S01.81XA LACERATION OF FOREHEAD: Status: RESOLVED | Noted: 2021-12-24 | Resolved: 2022-10-12

## 2022-12-13 ENCOUNTER — HOSPITAL ENCOUNTER (OUTPATIENT)
Dept: RADIOLOGY | Age: 57
Discharge: HOME | End: 2022-12-13
Attending: PSYCHIATRY & NEUROLOGY

## 2022-12-13 DIAGNOSIS — R55 SYNCOPE: ICD-10-CM

## 2022-12-13 RX ADMIN — GADOBUTROL 5 ML: 604.72 INJECTION INTRAVENOUS at 15:22

## 2023-05-17 ENCOUNTER — OFFICE VISIT (OUTPATIENT)
Dept: OBGYN CLINIC | Facility: CLINIC | Age: 58
End: 2023-05-17

## 2023-05-17 VITALS — DIASTOLIC BLOOD PRESSURE: 67 MMHG | SYSTOLIC BLOOD PRESSURE: 107 MMHG | HEART RATE: 57 BPM

## 2023-05-17 DIAGNOSIS — M18.11 ARTHRITIS OF CARPOMETACARPAL (CMC) JOINT OF RIGHT THUMB: Primary | ICD-10-CM

## 2023-05-17 DIAGNOSIS — M18.12 ARTHRITIS OF CARPOMETACARPAL (CMC) JOINT OF LEFT THUMB: ICD-10-CM

## 2023-05-17 RX ORDER — TRIAMCINOLONE ACETONIDE 40 MG/ML
20 INJECTION, SUSPENSION INTRA-ARTICULAR; INTRAMUSCULAR
Status: COMPLETED | OUTPATIENT
Start: 2023-05-17 | End: 2023-05-17

## 2023-05-17 RX ORDER — LIDOCAINE HYDROCHLORIDE 10 MG/ML
1 INJECTION, SOLUTION INFILTRATION; PERINEURAL
Status: COMPLETED | OUTPATIENT
Start: 2023-05-17 | End: 2023-05-17

## 2023-05-17 RX ADMIN — TRIAMCINOLONE ACETONIDE 20 MG: 40 INJECTION, SUSPENSION INTRA-ARTICULAR; INTRAMUSCULAR at 15:37

## 2023-05-17 RX ADMIN — LIDOCAINE HYDROCHLORIDE 1 ML: 10 INJECTION, SOLUTION INFILTRATION; PERINEURAL at 15:37

## 2023-05-17 NOTE — PROGRESS NOTES
ASSESSMENT/PLAN:      63year old female here for her bilateral thumb CMC joint OA  She has been getting periodic cortisone injections that gave her 4 months of relief  She wishes to continue with the cortisone injections for the thumb CMC joint, that she tolerated well  Continue with the heat, oral NSAIDs and thumb comfort cool  We will follow up in 3-4 months  The patient verbalized understanding of exam findings and treatment plan  We engaged in the shared decision-making process and treatment options were discussed at length with the patient  Surgical and conservative management discussed today along with risks and benefits  Diagnoses and all orders for this visit:    Arthritis of carpometacarpal [place]) joint of right thumb    Arthritis of carpometacarpal (CMC) joint of left thumb    Other orders  -     Small joint arthrocentesis  -     Small joint arthrocentesis        Follow Up:  Return in about 3 months (around 8/17/2023)  To Do Next Visit:  Re-evaluation of current issue    ____________________________________________________________________________________________________________________________________________      CHIEF COMPLAINT:  Chief Complaint   Patient presents with   • Left Thumb - Pain     Requesting injection   • Right Thumb - Pain     Requesting injection, states right thumb is worse, pain sometimes goes down to the wrist         SUBJECTIVE:  Marie Nazario is a 63y o  year old RHD female who presents today for her bilateral thumb CMC joint pain due to osteoarthritis  At her last visit on 2/21/22, she had bilateral thumb CMC joint injections, that gave her significant pain  She reports that in the last 3 months, she has had more difficulty with use the hands  Right is worse than the left, with pain going into the wrist         I have personally reviewed all the relevant PMH, PSH, SH, FH, Medications and allergies       PAST MEDICAL HISTORY:  Past Medical History:   Diagnosis Date • Known health problems: none        PAST SURGICAL HISTORY:  Past Surgical History:   Procedure Laterality Date   • AUGMENTATION BREAST     • LAPAROTOMY         FAMILY HISTORY:  Family History   Problem Relation Age of Onset   • Colon cancer Mother    • Prostate cancer Father    • Aortic aneurysm Father    • Diabetes Father    • Transient ischemic attack Father    • Stroke Paternal Grandfather    • Seizures Neg Hx        SOCIAL HISTORY:  Social History     Tobacco Use   • Smoking status: Never   • Smokeless tobacco: Never   Vaping Use   • Vaping Use: Never used   Substance Use Topics   • Alcohol use: Yes     Comment: social   • Drug use: Never       MEDICATIONS:    Current Outpatient Medications:   •  PARoxetine (PAXIL) 30 mg tablet, Take 30 mg by mouth every morning, Disp: , Rfl:     ALLERGIES:  Allergies   Allergen Reactions   • Penicillins Rash     Pt gets yeast infections from antibiotic        REVIEW OF SYSTEMS:  Review of Systems   Constitutional: Negative for chills, fever and unexpected weight change  HENT: Negative for hearing loss, nosebleeds and sore throat  Eyes: Negative for pain, redness and visual disturbance  Respiratory: Negative for cough, shortness of breath and wheezing  Cardiovascular: Negative for chest pain, palpitations and leg swelling  Gastrointestinal: Negative for abdominal pain and nausea  Genitourinary: Negative for dyspareunia, dysuria and frequency  Musculoskeletal: Positive for arthralgias  Skin: Negative for rash and wound  Neurological: Negative for dizziness, numbness and headaches  Psychiatric/Behavioral: Negative for decreased concentration and suicidal ideas  The patient is not nervous/anxious          VITALS:  Vitals:    05/17/23 1508   BP: 107/67   Pulse: 57       LABS:  HgA1c: No results found for: HGBA1C  BMP:   Lab Results   Component Value Date    GLUCOSE 101 05/18/2014    CALCIUM 9 1 12/25/2021     05/18/2014    K 4 0 12/25/2021    CO2 29 "12/25/2021     12/25/2021    BUN 20 12/25/2021    CREATININE 0 86 12/25/2021       _____________________________________________________  PHYSICAL EXAMINATION:  General: well developed and well nourished, alert, oriented times 3 and appears comfortable  Psychiatric: Normal  HEENT: Normocephalic, Atraumatic Trachea Midline, No torticollis  Pulmonary: No audible wheezing or respiratory distress   Cardiovascular: No pitting edema, 2+ radial pulse   Abdominal/GI: abdomen non tender, non distended   Skin: No masses, erythema, lacerations, fluctation, ulcerations  Neurovascular: Sensation Intact to the Median, Ulnar, Radial Nerve, Motor Intact to the Median, Ulnar, Radial Nerve and Pulses Intact  Musculoskeletal: Normal, except as noted in detailed exam and in HPI  MUSCULOSKELETAL EXAMINATION:    right CMC Exam:  No adduction contracture  No hyperextension deformity of MCP joint  Positive localized tenderness over radial and dorsal aspect of thumb (CMC joint)  Grind test is Positive for pain and Positive for crepitus  Metacarpal load shift test Positive  No triggering or tenderness over the A1 pulley  Negative pain with Finkelstein’s maneuver     left CMC Exam:  No adduction contracture  No hyperextension deformity of MCP joint  Positive localized tenderness over radial and dorsal aspect of thumb (CMC joint)  Grind test is Negative for pain and Negative for crepitus  Metacarpal load shift test negative  No triggering or tenderness over the A1 pulley  Negative pain with Finkelstein’s maneuver     ___________________________________________________  STUDIES REVIEWED:  No images reviewed at today's visit        PROCEDURES PERFORMED:  Small joint arthrocentesis: L thumb CMC  [place] Protocol:  Consent: Verbal consent obtained    Risks and benefits: risks, benefits and alternatives were discussed  Consent given by: patient  Time out: Immediately prior to procedure a \"time out\" was called to verify the correct " "patient, procedure, equipment, support staff and site/side marked as required  Timeout called at: 5/17/2023 3:34 PM   Patient understanding: patient states understanding of the procedure being performed  Site marked: the operative site was marked  Patient identity confirmed: verbally with patient    Supporting Documentation  Indications: pain   Procedure Details  Location: thumb - L thumb CMC  Preparation: Patient was prepped and draped in the usual sterile fashion  Needle size: 25 G  Ultrasound guidance: no  Approach: volar  Medications administered: 1 mL lidocaine 1 %; 20 mg triamcinolone acetonide 40 mg/mL    Patient tolerance: patient tolerated the procedure well with no immediate complications  Dressing:  Sterile dressing applied    Small joint arthrocentesis: R thumb CMC  [place] Protocol:  Consent: Verbal consent obtained  Risks and benefits: risks, benefits and alternatives were discussed  Consent given by: patient  Time out: Immediately prior to procedure a \"time out\" was called to verify the correct patient, procedure, equipment, support staff and site/side marked as required    Timeout called at: 5/17/2023 3:35 PM   Patient understanding: patient states understanding of the procedure being performed  Site marked: the operative site was marked  Patient identity confirmed: verbally with patient    Supporting Documentation  Indications: pain   Procedure Details  Location: thumb - R thumb CMC  Preparation: Patient was prepped and draped in the usual sterile fashion  Needle size: 25 G  Ultrasound guidance: no  Approach: volar  Medications administered: 1 mL lidocaine 1 %; 20 mg triamcinolone acetonide 40 mg/mL    Patient tolerance: patient tolerated the procedure well with no immediate complications  Dressing:  Sterile dressing applied        _____________________________________________________      Scribe Attestation    I,:  Mahesh Peoples am acting as a scribe while in the presence of the attending " physician :       I,:  Ganga Vanessa MD personally performed the services described in this documentation    as scribed in my presence :

## 2023-06-07 ENCOUNTER — OFFICE VISIT (OUTPATIENT)
Dept: NEUROLOGY | Facility: CLINIC | Age: 58
End: 2023-06-07
Payer: COMMERCIAL

## 2023-06-07 VITALS
DIASTOLIC BLOOD PRESSURE: 68 MMHG | BODY MASS INDEX: 19.21 KG/M2 | SYSTOLIC BLOOD PRESSURE: 114 MMHG | TEMPERATURE: 98 F | WEIGHT: 129.7 LBS | HEIGHT: 69 IN | HEART RATE: 55 BPM

## 2023-06-07 DIAGNOSIS — R93.89 ABNORMAL MRI: ICD-10-CM

## 2023-06-07 DIAGNOSIS — R55 SYNCOPE, UNSPECIFIED SYNCOPE TYPE: Primary | ICD-10-CM

## 2023-06-07 PROCEDURE — 99214 OFFICE O/P EST MOD 30 MIN: CPT | Performed by: PSYCHIATRY & NEUROLOGY

## 2023-06-07 RX ORDER — MULTIVITAMIN
1 TABLET ORAL DAILY
COMMUNITY

## 2023-06-07 NOTE — PROGRESS NOTES
Patient ID: Rita Ceballos is a 63 y o  female with syncope, who is returning to Neurology office for follow up of her syncope  Assessment/Plan:    Syncope  She has not had any additional episodes where she has lost consciousness  She will still get some dizziness when she gets up quickly with some changes in her vision  This appears to be most consistent with presyncope  Her prior evaluation did not show any evidence of seizures  She did have a questionable pituitary lesion on MRI which is likely noncontributory and incidentally found  At this point, she does not need to be started on a seizure medication, and I would not recommend any additional testing for these events unless they will continue to occur or worsen  If they would worsen, she may benefit from additional EEGs to better characterize her events  --I did recommend that she continue to stay well-hydrated and avoid rapid changes in position  Abnormal MRI  We discussed the results of her MRI and the potential lesion seen on her pituitary  It is unclear what this may be, and it may be a benign finding/Rathke cleft cyst   Will be important for us to reimage with an MRI that has special attention to the pituitary  I will order this MRI today    Tinnitus  She has been having some difficulty with tenderness  We discussed that this is likely related to mild hearing loss  There is no clear cause of her tenderness on MRI  We discussed strategies such as white noise machines and other things to try to lessen the tinnitus  She will Return in about 4 months (around 10/7/2023)  Subjective:    HPI  Current medications as per Epic  Since her last visit, sometimes when she gets up out of bed, she will feel dizzy  She will see sparkles in her vision and she may feel a little off balance  This lasts at most up to a minute  She will hold onto something and it will pass   She denies any problems/symptoms when turning or rolling around "in bed  This will also happen if she gets up out of her car  She will drink 2-3 cups of coffee in the morning, and drinks 3 large refillable water bottles of water in a day  If things are really quiet, she will sometimes get ringing in her ears  She feels like it is probably there all the time, but typically, she ignores it  Again, she mainly notices it at night when things are quiet  About 3-4 times a week, she will note allodynia on the crown of her head  This feels like an ache  This isn't bothersome, but is something that she has noticed  Prior Seizure Medications: none       Objective:    Blood pressure 114/68, pulse 55, temperature 98 °F (36 7 °C), temperature source Temporal, height 5' 9\" (1 753 m), weight 58 8 kg (129 lb 11 2 oz)  Physical Exam    Neurological Exam      ROS:    Review of Systems    I personally reviewed the ROS that was entered by the medical assistant in a separate note  Voice recognition software was used in the generation of this note  There may be unintentional errors including grammatical errors, spelling errors, or pronoun errors       "

## 2023-06-07 NOTE — PATIENT INSTRUCTIONS
-- Please get an MRI of your brain with special attention to the pituitary  -- Please increase your hydration to drink things with electrolytes to improve your symptoms    -- for the ringing in your ears, you can consider talking with your PCP about getting your hearing tested  This is the most common cause of ringing in the ears

## 2023-06-07 NOTE — PROGRESS NOTES
Review of Systems   Constitutional: Negative  HENT: Positive for tinnitus  Eyes: Negative  Respiratory: Negative  Cardiovascular: Negative  Gastrointestinal: Negative  Endocrine: Negative  Genitourinary: Negative  Musculoskeletal: Negative  Skin: Negative  Allergic/Immunologic: Negative  Neurological: Negative  Hematological: Negative  Psychiatric/Behavioral: Negative

## 2023-06-20 PROBLEM — H93.19 TINNITUS: Status: ACTIVE | Noted: 2023-06-20

## 2023-06-20 PROBLEM — R93.89 ABNORMAL MRI: Status: ACTIVE | Noted: 2023-06-20

## 2023-06-20 NOTE — ASSESSMENT & PLAN NOTE
She has not had any additional episodes where she has lost consciousness  She will still get some dizziness when she gets up quickly with some changes in her vision  This appears to be most consistent with presyncope  Her prior evaluation did not show any evidence of seizures  She did have a questionable pituitary lesion on MRI which is likely noncontributory and incidentally found  At this point, she does not need to be started on a seizure medication, and I would not recommend any additional testing for these events unless they will continue to occur or worsen  If they would worsen, she may benefit from additional EEGs to better characterize her events  --I did recommend that she continue to stay well-hydrated and avoid rapid changes in position

## 2023-06-20 NOTE — ASSESSMENT & PLAN NOTE
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

## 2023-06-20 NOTE — ASSESSMENT & PLAN NOTE
She has been having some difficulty with tenderness  We discussed that this is likely related to mild hearing loss  There is no clear cause of her tenderness on MRI  We discussed strategies such as white noise machines and other things to try to lessen the tinnitus

## 2023-08-08 ENCOUNTER — TELEPHONE (OUTPATIENT)
Dept: PSYCHIATRY | Facility: CLINIC | Age: 58
End: 2023-08-08

## 2024-06-13 ENCOUNTER — TELEPHONE (OUTPATIENT)
Dept: PSYCHIATRY | Facility: CLINIC | Age: 59
End: 2024-06-13

## 2024-06-13 NOTE — TELEPHONE ENCOUNTER
[facility], patient will be removed from psychiatric wait list.